# Patient Record
Sex: MALE | Race: WHITE | NOT HISPANIC OR LATINO
[De-identification: names, ages, dates, MRNs, and addresses within clinical notes are randomized per-mention and may not be internally consistent; named-entity substitution may affect disease eponyms.]

---

## 2015-11-24 RX ORDER — ASPIRIN/CALCIUM CARB/MAGNESIUM 324 MG
1 TABLET ORAL
Qty: 0 | Refills: 0 | DISCHARGE
Start: 2015-11-24

## 2017-03-17 ENCOUNTER — NON-APPOINTMENT (OUTPATIENT)
Age: 67
End: 2017-03-17

## 2017-03-17 ENCOUNTER — APPOINTMENT (OUTPATIENT)
Dept: CARDIOLOGY | Facility: CLINIC | Age: 67
End: 2017-03-17

## 2017-03-17 VITALS
DIASTOLIC BLOOD PRESSURE: 75 MMHG | OXYGEN SATURATION: 97 % | SYSTOLIC BLOOD PRESSURE: 172 MMHG | HEIGHT: 65 IN | WEIGHT: 241 LBS | BODY MASS INDEX: 40.15 KG/M2 | HEART RATE: 101 BPM

## 2017-05-15 ENCOUNTER — MEDICATION RENEWAL (OUTPATIENT)
Age: 67
End: 2017-05-15

## 2017-11-06 ENCOUNTER — APPOINTMENT (OUTPATIENT)
Dept: CARDIOLOGY | Facility: CLINIC | Age: 67
End: 2017-11-06
Payer: COMMERCIAL

## 2017-11-06 VITALS
WEIGHT: 238 LBS | BODY MASS INDEX: 39.65 KG/M2 | OXYGEN SATURATION: 95 % | HEART RATE: 72 BPM | SYSTOLIC BLOOD PRESSURE: 126 MMHG | DIASTOLIC BLOOD PRESSURE: 76 MMHG | HEIGHT: 65 IN

## 2017-11-06 PROCEDURE — 99215 OFFICE O/P EST HI 40 MIN: CPT

## 2017-11-22 ENCOUNTER — APPOINTMENT (OUTPATIENT)
Dept: CARDIOLOGY | Facility: CLINIC | Age: 67
End: 2017-11-22
Payer: COMMERCIAL

## 2017-11-22 PROCEDURE — A9500: CPT

## 2017-11-22 PROCEDURE — 93015 CV STRESS TEST SUPVJ I&R: CPT

## 2017-11-22 PROCEDURE — 78452 HT MUSCLE IMAGE SPECT MULT: CPT

## 2017-11-27 ENCOUNTER — APPOINTMENT (OUTPATIENT)
Dept: CARDIOLOGY | Facility: CLINIC | Age: 67
End: 2017-11-27
Payer: COMMERCIAL

## 2017-11-27 PROCEDURE — 93306 TTE W/DOPPLER COMPLETE: CPT

## 2017-12-11 DIAGNOSIS — R94.39 ABNORMAL RESULT OF OTHER CARDIOVASCULAR FUNCTION STUDY: ICD-10-CM

## 2017-12-14 ENCOUNTER — OUTPATIENT (OUTPATIENT)
Dept: OUTPATIENT SERVICES | Facility: HOSPITAL | Age: 67
LOS: 1 days | End: 2017-12-14
Payer: COMMERCIAL

## 2017-12-14 VITALS
DIASTOLIC BLOOD PRESSURE: 91 MMHG | RESPIRATION RATE: 18 BRPM | WEIGHT: 235.01 LBS | OXYGEN SATURATION: 96 % | SYSTOLIC BLOOD PRESSURE: 160 MMHG | HEART RATE: 90 BPM | TEMPERATURE: 98 F | HEIGHT: 65 IN

## 2017-12-14 DIAGNOSIS — R06.02 SHORTNESS OF BREATH: ICD-10-CM

## 2017-12-14 DIAGNOSIS — Z95.1 PRESENCE OF AORTOCORONARY BYPASS GRAFT: Chronic | ICD-10-CM

## 2017-12-14 DIAGNOSIS — Z86.79 PERSONAL HISTORY OF OTHER DISEASES OF THE CIRCULATORY SYSTEM: Chronic | ICD-10-CM

## 2017-12-14 DIAGNOSIS — Z98.89 OTHER SPECIFIED POSTPROCEDURAL STATES: Chronic | ICD-10-CM

## 2017-12-14 LAB
ALBUMIN SERPL ELPH-MCNC: 4.4 G/DL — SIGNIFICANT CHANGE UP (ref 3.3–5)
ALP SERPL-CCNC: 65 U/L — SIGNIFICANT CHANGE UP (ref 40–120)
ALT FLD-CCNC: 13 U/L RC — SIGNIFICANT CHANGE UP (ref 10–45)
ANION GAP SERPL CALC-SCNC: 12 MMOL/L — SIGNIFICANT CHANGE UP (ref 5–17)
AST SERPL-CCNC: 23 U/L — SIGNIFICANT CHANGE UP (ref 10–40)
BILIRUB SERPL-MCNC: 0.3 MG/DL — SIGNIFICANT CHANGE UP (ref 0.2–1.2)
BUN SERPL-MCNC: 16 MG/DL — SIGNIFICANT CHANGE UP (ref 7–23)
CALCIUM SERPL-MCNC: 9.5 MG/DL — SIGNIFICANT CHANGE UP (ref 8.4–10.5)
CHLORIDE SERPL-SCNC: 104 MMOL/L — SIGNIFICANT CHANGE UP (ref 96–108)
CO2 SERPL-SCNC: 26 MMOL/L — SIGNIFICANT CHANGE UP (ref 22–31)
CREAT SERPL-MCNC: 0.78 MG/DL — SIGNIFICANT CHANGE UP (ref 0.5–1.3)
GLUCOSE BLDC GLUCOMTR-MCNC: 108 MG/DL — HIGH (ref 70–99)
GLUCOSE SERPL-MCNC: 119 MG/DL — HIGH (ref 70–99)
HCT VFR BLD CALC: 45.8 % — SIGNIFICANT CHANGE UP (ref 39–50)
HGB BLD-MCNC: 15.2 G/DL — SIGNIFICANT CHANGE UP (ref 13–17)
MCHC RBC-ENTMCNC: 30.8 PG — SIGNIFICANT CHANGE UP (ref 27–34)
MCHC RBC-ENTMCNC: 33.2 GM/DL — SIGNIFICANT CHANGE UP (ref 32–36)
MCV RBC AUTO: 92.7 FL — SIGNIFICANT CHANGE UP (ref 80–100)
PLATELET # BLD AUTO: 290 K/UL — SIGNIFICANT CHANGE UP (ref 150–400)
POTASSIUM SERPL-MCNC: 4.6 MMOL/L — SIGNIFICANT CHANGE UP (ref 3.5–5.3)
POTASSIUM SERPL-SCNC: 4.6 MMOL/L — SIGNIFICANT CHANGE UP (ref 3.5–5.3)
PROT SERPL-MCNC: 7.5 G/DL — SIGNIFICANT CHANGE UP (ref 6–8.3)
RBC # BLD: 4.94 M/UL — SIGNIFICANT CHANGE UP (ref 4.2–5.8)
RBC # FLD: 13.5 % — SIGNIFICANT CHANGE UP (ref 10.3–14.5)
SODIUM SERPL-SCNC: 142 MMOL/L — SIGNIFICANT CHANGE UP (ref 135–145)
WBC # BLD: 9.6 K/UL — SIGNIFICANT CHANGE UP (ref 3.8–10.5)
WBC # FLD AUTO: 9.6 K/UL — SIGNIFICANT CHANGE UP (ref 3.8–10.5)

## 2017-12-14 PROCEDURE — 93457 R HRT ART/GRFT ANGIO: CPT

## 2017-12-14 PROCEDURE — 80053 COMPREHEN METABOLIC PANEL: CPT

## 2017-12-14 PROCEDURE — 93457 R HRT ART/GRFT ANGIO: CPT | Mod: 26

## 2017-12-14 PROCEDURE — C1887: CPT

## 2017-12-14 PROCEDURE — C1769: CPT

## 2017-12-14 PROCEDURE — C1894: CPT

## 2017-12-14 PROCEDURE — 93010 ELECTROCARDIOGRAM REPORT: CPT

## 2017-12-14 PROCEDURE — 85027 COMPLETE CBC AUTOMATED: CPT

## 2017-12-14 PROCEDURE — 82962 GLUCOSE BLOOD TEST: CPT

## 2017-12-14 PROCEDURE — 93005 ELECTROCARDIOGRAM TRACING: CPT

## 2017-12-14 RX ORDER — RANOLAZINE 500 MG/1
500 TABLET, FILM COATED, EXTENDED RELEASE ORAL ONCE
Qty: 0 | Refills: 0 | Status: COMPLETED | OUTPATIENT
Start: 2017-12-14 | End: 2017-12-14

## 2017-12-14 RX ADMIN — RANOLAZINE 500 MILLIGRAM(S): 500 TABLET, FILM COATED, EXTENDED RELEASE ORAL at 10:36

## 2017-12-14 NOTE — H&P CARDIOLOGY - HISTORY OF PRESENT ILLNESS
This is a 66 yo Citizen of [place] male with PMH former smoker 90 pack years, obesity, DM2 ( A1C 6.5, followed by endo Dr Dumont), HTN, HLD, CAD s/p CABG 4 vessel by Dr. Denis (AGUILERA-LAD, SVG-diagonal, SVG sequential graft to OM1-LCx PL) presented to cardiology, Dr. Phan, with complaints of progressing exertional SOB/JACOME over past 4 months relieved with rest.  Pt. states he has had a few episodes of nonradiating right sided chest pressure that was not associated with dizziness, diaphoresis, palpitations, nausea, vomiting, peripheral edema, recent weight gain, or syncope. NST 11/22/17 EF 46% medium sized, moderate defect in basal to mid inferoseptum and inferior wall that is partially reversible consistent with mild ischemia with scarring. TTE 11/27/17 EF 55-60% MAC, minimal MR.  Pt. presents today asymptomatic for further evaluation and cardiac cath.     Antianginal regimen:  Metoprolol; given Ranexa x 1 dose      < from: Cardiac Cath Lab - Adult (11.17.15 @ 13:46) >  Global left ventricular function was mildly depressed. EF  estimated was 50 %.  CORONARY VESSELS: The coronary circulation is left dominant.  LM:   --  Distal left main: There was a 80 % stenosis.  LAD:   --  LAD: Angiography showed mild atherosclerosis with no flow  limiting lesions.  CX:   --  Ostial circumflex: There was a 90 % stenosis.  --  OM1: There was a 80 % stenosis.  RCA:   --  RCA: Normal.  COMPLICATIONS: There were no complications.  DIAGNOSTIC RECOMMENDATIONS: Consultation with a cardiac surgeon will be  obtained for coronary artery bypass grafting.    < end of copied text >

## 2017-12-14 NOTE — H&P CARDIOLOGY - FAMILY HISTORY
Mother  Still living? Yes, Estimated age: Age Unknown  Family history of atrial fibrillation, Age at diagnosis: Age Unknown

## 2018-01-02 ENCOUNTER — NON-APPOINTMENT (OUTPATIENT)
Age: 68
End: 2018-01-02

## 2018-01-02 ENCOUNTER — APPOINTMENT (OUTPATIENT)
Dept: CARDIOLOGY | Facility: CLINIC | Age: 68
End: 2018-01-02
Payer: COMMERCIAL

## 2018-01-02 VITALS — HEART RATE: 80 BPM | SYSTOLIC BLOOD PRESSURE: 156 MMHG | DIASTOLIC BLOOD PRESSURE: 86 MMHG

## 2018-01-02 VITALS
BODY MASS INDEX: 39.15 KG/M2 | HEART RATE: 96 BPM | HEIGHT: 65 IN | DIASTOLIC BLOOD PRESSURE: 86 MMHG | SYSTOLIC BLOOD PRESSURE: 168 MMHG | OXYGEN SATURATION: 96 % | WEIGHT: 235 LBS

## 2018-01-02 DIAGNOSIS — Z95.1 PRESENCE OF AORTOCORONARY BYPASS GRAFT: ICD-10-CM

## 2018-01-02 PROCEDURE — 93000 ELECTROCARDIOGRAM COMPLETE: CPT

## 2018-01-02 PROCEDURE — 99215 OFFICE O/P EST HI 40 MIN: CPT

## 2018-01-02 RX ORDER — PEN NEEDLE, DIABETIC 29 G X1/2"
32G X 4 MM NEEDLE, DISPOSABLE MISCELLANEOUS
Qty: 540 | Refills: 0 | Status: ACTIVE | COMMUNITY
Start: 2016-07-06

## 2018-01-02 RX ORDER — EMPAGLIFLOZIN, METFORMIN HYDROCHLORIDE 12.5; 1 MG/1; MG/1
12.5-1 TABLET, EXTENDED RELEASE ORAL
Qty: 180 | Refills: 0 | Status: ACTIVE | COMMUNITY
Start: 2017-12-26

## 2018-01-02 RX ORDER — BLOOD SUGAR DIAGNOSTIC
STRIP MISCELLANEOUS
Qty: 600 | Refills: 0 | Status: ACTIVE | COMMUNITY
Start: 2016-11-07

## 2018-01-02 RX ORDER — INSULIN LISPRO 100 [IU]/ML
100 INJECTION, SOLUTION INTRAVENOUS; SUBCUTANEOUS
Qty: 30 | Refills: 0 | Status: ACTIVE | COMMUNITY
Start: 2017-09-06

## 2018-01-02 RX ORDER — INSULIN GLARGINE 100 [IU]/ML
100 INJECTION, SOLUTION SUBCUTANEOUS
Qty: 30 | Refills: 0 | Status: ACTIVE | COMMUNITY
Start: 2017-08-04

## 2018-02-21 ENCOUNTER — OUTPATIENT (OUTPATIENT)
Dept: OUTPATIENT SERVICES | Facility: HOSPITAL | Age: 68
LOS: 1 days | End: 2018-02-21
Payer: COMMERCIAL

## 2018-02-21 ENCOUNTER — APPOINTMENT (OUTPATIENT)
Dept: CT IMAGING | Facility: CLINIC | Age: 68
End: 2018-02-21

## 2018-02-21 DIAGNOSIS — Z86.79 PERSONAL HISTORY OF OTHER DISEASES OF THE CIRCULATORY SYSTEM: Chronic | ICD-10-CM

## 2018-02-21 DIAGNOSIS — Z95.1 PRESENCE OF AORTOCORONARY BYPASS GRAFT: Chronic | ICD-10-CM

## 2018-02-21 DIAGNOSIS — Z98.89 OTHER SPECIFIED POSTPROCEDURAL STATES: Chronic | ICD-10-CM

## 2018-02-21 DIAGNOSIS — Z00.8 ENCOUNTER FOR OTHER GENERAL EXAMINATION: ICD-10-CM

## 2018-02-21 PROCEDURE — 71250 CT THORAX DX C-: CPT

## 2018-02-21 PROCEDURE — 71250 CT THORAX DX C-: CPT | Mod: 26

## 2018-03-09 ENCOUNTER — APPOINTMENT (OUTPATIENT)
Dept: CARDIOLOGY | Facility: CLINIC | Age: 68
End: 2018-03-09
Payer: COMMERCIAL

## 2018-03-09 PROCEDURE — 93015 CV STRESS TEST SUPVJ I&R: CPT

## 2018-03-12 ENCOUNTER — APPOINTMENT (OUTPATIENT)
Dept: CARDIOLOGY | Facility: CLINIC | Age: 68
End: 2018-03-12
Payer: COMMERCIAL

## 2018-03-12 ENCOUNTER — NON-APPOINTMENT (OUTPATIENT)
Age: 68
End: 2018-03-12

## 2018-03-12 VITALS
SYSTOLIC BLOOD PRESSURE: 152 MMHG | DIASTOLIC BLOOD PRESSURE: 83 MMHG | HEIGHT: 65 IN | HEART RATE: 85 BPM | BODY MASS INDEX: 39.99 KG/M2 | OXYGEN SATURATION: 94 % | WEIGHT: 240 LBS

## 2018-03-12 PROCEDURE — 99214 OFFICE O/P EST MOD 30 MIN: CPT

## 2018-03-12 PROCEDURE — 93000 ELECTROCARDIOGRAM COMPLETE: CPT

## 2018-03-13 ENCOUNTER — APPOINTMENT (OUTPATIENT)
Dept: CARDIOLOGY | Facility: CLINIC | Age: 68
End: 2018-03-13
Payer: COMMERCIAL

## 2018-03-13 PROCEDURE — 93798 PHYS/QHP OP CAR RHAB W/ECG: CPT

## 2018-04-02 ENCOUNTER — APPOINTMENT (OUTPATIENT)
Dept: CARDIOLOGY | Facility: CLINIC | Age: 68
End: 2018-04-02
Payer: COMMERCIAL

## 2018-04-02 PROCEDURE — 93798 PHYS/QHP OP CAR RHAB W/ECG: CPT

## 2018-04-04 ENCOUNTER — APPOINTMENT (OUTPATIENT)
Dept: CARDIOLOGY | Facility: CLINIC | Age: 68
End: 2018-04-04
Payer: COMMERCIAL

## 2018-04-04 PROCEDURE — 93798 PHYS/QHP OP CAR RHAB W/ECG: CPT

## 2018-04-05 ENCOUNTER — APPOINTMENT (OUTPATIENT)
Dept: CARDIOLOGY | Facility: CLINIC | Age: 68
End: 2018-04-05
Payer: COMMERCIAL

## 2018-04-05 PROCEDURE — 93798 PHYS/QHP OP CAR RHAB W/ECG: CPT

## 2018-04-09 ENCOUNTER — APPOINTMENT (OUTPATIENT)
Dept: CARDIOLOGY | Facility: CLINIC | Age: 68
End: 2018-04-09
Payer: COMMERCIAL

## 2018-04-09 PROCEDURE — 93798 PHYS/QHP OP CAR RHAB W/ECG: CPT

## 2018-04-11 ENCOUNTER — APPOINTMENT (OUTPATIENT)
Dept: CARDIOLOGY | Facility: CLINIC | Age: 68
End: 2018-04-11
Payer: COMMERCIAL

## 2018-04-11 PROCEDURE — 93798 PHYS/QHP OP CAR RHAB W/ECG: CPT

## 2018-04-12 ENCOUNTER — APPOINTMENT (OUTPATIENT)
Dept: CARDIOLOGY | Facility: CLINIC | Age: 68
End: 2018-04-12
Payer: COMMERCIAL

## 2018-04-12 PROCEDURE — 93798 PHYS/QHP OP CAR RHAB W/ECG: CPT

## 2018-04-16 ENCOUNTER — APPOINTMENT (OUTPATIENT)
Dept: CARDIOLOGY | Facility: CLINIC | Age: 68
End: 2018-04-16
Payer: COMMERCIAL

## 2018-04-16 PROCEDURE — 93798 PHYS/QHP OP CAR RHAB W/ECG: CPT

## 2018-04-18 ENCOUNTER — APPOINTMENT (OUTPATIENT)
Dept: CARDIOLOGY | Facility: CLINIC | Age: 68
End: 2018-04-18
Payer: COMMERCIAL

## 2018-04-18 PROCEDURE — 93798 PHYS/QHP OP CAR RHAB W/ECG: CPT

## 2018-04-19 ENCOUNTER — APPOINTMENT (OUTPATIENT)
Dept: CARDIOLOGY | Facility: CLINIC | Age: 68
End: 2018-04-19
Payer: COMMERCIAL

## 2018-04-19 PROCEDURE — 93798 PHYS/QHP OP CAR RHAB W/ECG: CPT

## 2018-04-23 ENCOUNTER — APPOINTMENT (OUTPATIENT)
Dept: CARDIOLOGY | Facility: CLINIC | Age: 68
End: 2018-04-23
Payer: COMMERCIAL

## 2018-04-23 PROCEDURE — 93798 PHYS/QHP OP CAR RHAB W/ECG: CPT

## 2018-04-25 ENCOUNTER — APPOINTMENT (OUTPATIENT)
Dept: CARDIOLOGY | Facility: CLINIC | Age: 68
End: 2018-04-25
Payer: COMMERCIAL

## 2018-04-25 PROCEDURE — 93798 PHYS/QHP OP CAR RHAB W/ECG: CPT

## 2018-04-26 ENCOUNTER — APPOINTMENT (OUTPATIENT)
Dept: CARDIOLOGY | Facility: CLINIC | Age: 68
End: 2018-04-26
Payer: COMMERCIAL

## 2018-04-26 PROCEDURE — 93798 PHYS/QHP OP CAR RHAB W/ECG: CPT

## 2018-04-30 ENCOUNTER — APPOINTMENT (OUTPATIENT)
Dept: CARDIOLOGY | Facility: CLINIC | Age: 68
End: 2018-04-30
Payer: COMMERCIAL

## 2018-04-30 PROCEDURE — 93798 PHYS/QHP OP CAR RHAB W/ECG: CPT

## 2018-05-02 ENCOUNTER — APPOINTMENT (OUTPATIENT)
Dept: CARDIOLOGY | Facility: CLINIC | Age: 68
End: 2018-05-02
Payer: COMMERCIAL

## 2018-05-02 PROCEDURE — 93798 PHYS/QHP OP CAR RHAB W/ECG: CPT

## 2018-05-03 ENCOUNTER — APPOINTMENT (OUTPATIENT)
Dept: CARDIOLOGY | Facility: CLINIC | Age: 68
End: 2018-05-03
Payer: COMMERCIAL

## 2018-05-03 PROCEDURE — 93798 PHYS/QHP OP CAR RHAB W/ECG: CPT

## 2018-05-07 ENCOUNTER — APPOINTMENT (OUTPATIENT)
Dept: CARDIOLOGY | Facility: CLINIC | Age: 68
End: 2018-05-07
Payer: COMMERCIAL

## 2018-05-07 PROCEDURE — 93798 PHYS/QHP OP CAR RHAB W/ECG: CPT

## 2018-05-09 ENCOUNTER — APPOINTMENT (OUTPATIENT)
Dept: CARDIOLOGY | Facility: CLINIC | Age: 68
End: 2018-05-09
Payer: COMMERCIAL

## 2018-05-09 PROCEDURE — 93798 PHYS/QHP OP CAR RHAB W/ECG: CPT

## 2018-05-10 ENCOUNTER — APPOINTMENT (OUTPATIENT)
Dept: CARDIOLOGY | Facility: CLINIC | Age: 68
End: 2018-05-10
Payer: COMMERCIAL

## 2018-05-10 PROCEDURE — 93798 PHYS/QHP OP CAR RHAB W/ECG: CPT

## 2018-05-11 ENCOUNTER — APPOINTMENT (OUTPATIENT)
Dept: CARDIOLOGY | Facility: CLINIC | Age: 68
End: 2018-05-11
Payer: COMMERCIAL

## 2018-05-14 ENCOUNTER — APPOINTMENT (OUTPATIENT)
Dept: CARDIOLOGY | Facility: CLINIC | Age: 68
End: 2018-05-14
Payer: COMMERCIAL

## 2018-05-14 PROCEDURE — 93798 PHYS/QHP OP CAR RHAB W/ECG: CPT

## 2018-05-15 ENCOUNTER — APPOINTMENT (OUTPATIENT)
Dept: CARDIOLOGY | Facility: CLINIC | Age: 68
End: 2018-05-15
Payer: COMMERCIAL

## 2018-05-15 VITALS
OXYGEN SATURATION: 94 % | DIASTOLIC BLOOD PRESSURE: 80 MMHG | BODY MASS INDEX: 39.65 KG/M2 | SYSTOLIC BLOOD PRESSURE: 133 MMHG | WEIGHT: 238 LBS | HEART RATE: 72 BPM | HEIGHT: 65 IN

## 2018-05-15 PROCEDURE — 99214 OFFICE O/P EST MOD 30 MIN: CPT

## 2018-05-16 ENCOUNTER — APPOINTMENT (OUTPATIENT)
Dept: CARDIOLOGY | Facility: CLINIC | Age: 68
End: 2018-05-16
Payer: COMMERCIAL

## 2018-05-16 PROCEDURE — 93798 PHYS/QHP OP CAR RHAB W/ECG: CPT

## 2018-05-17 ENCOUNTER — APPOINTMENT (OUTPATIENT)
Dept: CARDIOLOGY | Facility: CLINIC | Age: 68
End: 2018-05-17
Payer: COMMERCIAL

## 2018-05-17 PROCEDURE — 93798 PHYS/QHP OP CAR RHAB W/ECG: CPT

## 2018-05-22 ENCOUNTER — MEDICATION RENEWAL (OUTPATIENT)
Age: 68
End: 2018-05-22

## 2018-05-23 ENCOUNTER — APPOINTMENT (OUTPATIENT)
Dept: CARDIOLOGY | Facility: CLINIC | Age: 68
End: 2018-05-23
Payer: COMMERCIAL

## 2018-05-23 PROCEDURE — 93798 PHYS/QHP OP CAR RHAB W/ECG: CPT

## 2018-05-24 ENCOUNTER — APPOINTMENT (OUTPATIENT)
Dept: CARDIOLOGY | Facility: CLINIC | Age: 68
End: 2018-05-24
Payer: COMMERCIAL

## 2018-05-24 PROCEDURE — 93798 PHYS/QHP OP CAR RHAB W/ECG: CPT

## 2018-05-30 ENCOUNTER — APPOINTMENT (OUTPATIENT)
Dept: CARDIOLOGY | Facility: CLINIC | Age: 68
End: 2018-05-30
Payer: COMMERCIAL

## 2018-05-30 PROCEDURE — 93798 PHYS/QHP OP CAR RHAB W/ECG: CPT

## 2018-05-31 ENCOUNTER — APPOINTMENT (OUTPATIENT)
Dept: CARDIOLOGY | Facility: CLINIC | Age: 68
End: 2018-05-31
Payer: COMMERCIAL

## 2018-05-31 PROCEDURE — 93798 PHYS/QHP OP CAR RHAB W/ECG: CPT

## 2018-06-04 ENCOUNTER — APPOINTMENT (OUTPATIENT)
Dept: CARDIOLOGY | Facility: CLINIC | Age: 68
End: 2018-06-04
Payer: COMMERCIAL

## 2018-06-04 PROCEDURE — 93798 PHYS/QHP OP CAR RHAB W/ECG: CPT

## 2018-06-06 ENCOUNTER — APPOINTMENT (OUTPATIENT)
Dept: CARDIOLOGY | Facility: CLINIC | Age: 68
End: 2018-06-06
Payer: COMMERCIAL

## 2018-06-06 PROCEDURE — 93798 PHYS/QHP OP CAR RHAB W/ECG: CPT

## 2018-06-07 ENCOUNTER — APPOINTMENT (OUTPATIENT)
Dept: CARDIOLOGY | Facility: CLINIC | Age: 68
End: 2018-06-07
Payer: COMMERCIAL

## 2018-06-07 PROCEDURE — 93798 PHYS/QHP OP CAR RHAB W/ECG: CPT

## 2018-06-11 ENCOUNTER — APPOINTMENT (OUTPATIENT)
Dept: CARDIOLOGY | Facility: CLINIC | Age: 68
End: 2018-06-11
Payer: COMMERCIAL

## 2018-06-11 PROCEDURE — 93798 PHYS/QHP OP CAR RHAB W/ECG: CPT

## 2018-06-13 ENCOUNTER — APPOINTMENT (OUTPATIENT)
Dept: CARDIOLOGY | Facility: CLINIC | Age: 68
End: 2018-06-13
Payer: COMMERCIAL

## 2018-06-13 PROCEDURE — 93798 PHYS/QHP OP CAR RHAB W/ECG: CPT

## 2018-06-14 ENCOUNTER — APPOINTMENT (OUTPATIENT)
Dept: CARDIOLOGY | Facility: CLINIC | Age: 68
End: 2018-06-14
Payer: COMMERCIAL

## 2018-06-14 PROCEDURE — 93798 PHYS/QHP OP CAR RHAB W/ECG: CPT

## 2018-06-18 ENCOUNTER — APPOINTMENT (OUTPATIENT)
Dept: CARDIOLOGY | Facility: CLINIC | Age: 68
End: 2018-06-18
Payer: COMMERCIAL

## 2018-06-18 PROCEDURE — 93798 PHYS/QHP OP CAR RHAB W/ECG: CPT

## 2018-06-20 ENCOUNTER — APPOINTMENT (OUTPATIENT)
Dept: CARDIOLOGY | Facility: CLINIC | Age: 68
End: 2018-06-20
Payer: COMMERCIAL

## 2018-06-20 PROCEDURE — 93798 PHYS/QHP OP CAR RHAB W/ECG: CPT

## 2018-06-21 ENCOUNTER — APPOINTMENT (OUTPATIENT)
Dept: CARDIOLOGY | Facility: CLINIC | Age: 68
End: 2018-06-21
Payer: COMMERCIAL

## 2018-06-21 PROCEDURE — 93798 PHYS/QHP OP CAR RHAB W/ECG: CPT

## 2018-06-25 ENCOUNTER — APPOINTMENT (OUTPATIENT)
Dept: CARDIOLOGY | Facility: CLINIC | Age: 68
End: 2018-06-25
Payer: COMMERCIAL

## 2018-06-25 PROCEDURE — 93798 PHYS/QHP OP CAR RHAB W/ECG: CPT

## 2018-08-10 ENCOUNTER — RX RENEWAL (OUTPATIENT)
Age: 68
End: 2018-08-10

## 2018-08-16 ENCOUNTER — RX RENEWAL (OUTPATIENT)
Age: 68
End: 2018-08-16

## 2018-08-20 ENCOUNTER — APPOINTMENT (OUTPATIENT)
Dept: CARDIOLOGY | Facility: CLINIC | Age: 68
End: 2018-08-20
Payer: COMMERCIAL

## 2018-08-20 VITALS
WEIGHT: 234 LBS | OXYGEN SATURATION: 95 % | SYSTOLIC BLOOD PRESSURE: 123 MMHG | BODY MASS INDEX: 38.99 KG/M2 | HEART RATE: 84 BPM | HEIGHT: 65 IN | DIASTOLIC BLOOD PRESSURE: 75 MMHG

## 2018-08-20 PROCEDURE — 99214 OFFICE O/P EST MOD 30 MIN: CPT

## 2018-10-04 ENCOUNTER — TRANSCRIPTION ENCOUNTER (OUTPATIENT)
Age: 68
End: 2018-10-04

## 2018-10-05 ENCOUNTER — RESULT REVIEW (OUTPATIENT)
Age: 68
End: 2018-10-05

## 2018-10-05 ENCOUNTER — OUTPATIENT (OUTPATIENT)
Dept: OUTPATIENT SERVICES | Facility: HOSPITAL | Age: 68
LOS: 1 days | End: 2018-10-05
Payer: COMMERCIAL

## 2018-10-05 DIAGNOSIS — Z86.010 PERSONAL HISTORY OF COLONIC POLYPS: ICD-10-CM

## 2018-10-05 DIAGNOSIS — Z86.79 PERSONAL HISTORY OF OTHER DISEASES OF THE CIRCULATORY SYSTEM: Chronic | ICD-10-CM

## 2018-10-05 DIAGNOSIS — Z98.89 OTHER SPECIFIED POSTPROCEDURAL STATES: Chronic | ICD-10-CM

## 2018-10-05 DIAGNOSIS — Z95.1 PRESENCE OF AORTOCORONARY BYPASS GRAFT: Chronic | ICD-10-CM

## 2018-10-05 PROCEDURE — 88305 TISSUE EXAM BY PATHOLOGIST: CPT

## 2018-10-05 PROCEDURE — 45385 COLONOSCOPY W/LESION REMOVAL: CPT | Mod: PT

## 2018-10-05 PROCEDURE — 82962 GLUCOSE BLOOD TEST: CPT

## 2018-10-05 PROCEDURE — 88305 TISSUE EXAM BY PATHOLOGIST: CPT | Mod: 26

## 2018-10-08 LAB — SURGICAL PATHOLOGY FINAL REPORT - CH: SIGNIFICANT CHANGE UP

## 2018-12-17 ENCOUNTER — APPOINTMENT (OUTPATIENT)
Dept: CARDIOLOGY | Facility: CLINIC | Age: 68
End: 2018-12-17
Payer: COMMERCIAL

## 2018-12-17 VITALS
OXYGEN SATURATION: 96 % | BODY MASS INDEX: 37.99 KG/M2 | DIASTOLIC BLOOD PRESSURE: 77 MMHG | HEART RATE: 80 BPM | SYSTOLIC BLOOD PRESSURE: 124 MMHG | HEIGHT: 65 IN | WEIGHT: 228 LBS

## 2018-12-17 DIAGNOSIS — R06.09 OTHER FORMS OF DYSPNEA: ICD-10-CM

## 2018-12-17 PROCEDURE — 99214 OFFICE O/P EST MOD 30 MIN: CPT

## 2018-12-17 NOTE — REVIEW OF SYSTEMS
[Eyeglasses] : currently wearing eyeglasses [Shortness Of Breath] : shortness of breath [Chest Pain] : chest pain [Joint Pain] : joint pain [Hand Pain] : hand pain [Knee Pain] : knee pain [Negative] : Genitourinary [Fever] : no fever [Headache] : no headache [Chills] : no chills [Feeling Fatigued] : not feeling fatigued [Blurry Vision] : no blurred vision [Seeing Double (Diplopia)] : no diplopia [Earache] : no earache [Sore Throat] : no sore throat [Sinus Pressure] : no sinus pressure [Chest  Pressure] : no chest pressure [Lower Ext Edema] : no extremity edema [Leg Claudication] : no intermittent leg claudication [Palpitations] : no palpitations [Skin: A Rash] : no rash: [Dizziness] : no dizziness [Depression] : no depression [Anxiety] : no anxiety [Excessive Thirst] : no polydipsia [Easy Bleeding] : no tendency for easy bleeding [Easy Bruising] : no tendency for easy bruising

## 2018-12-17 NOTE — DISCUSSION/SUMMARY
[FreeTextEntry1] : The patient is doing well. He has no signs or symptoms of active heart disease. He continues with exercise and has no chest pain, increasing shortness of breath, or palpitation. On his medications blood pressure, cholesterol, and blood sugar well controlled.\par \par He will send his present medication. He has any problems or symptoms he will call me. Otherwise I will see him in 6 months

## 2018-12-17 NOTE — HISTORY OF PRESENT ILLNESS
[FreeTextEntry1] : I saw Ilsa Samuel in the office today for a follow up visit. He is a 68-year-old white male who has diabetes, hypertension, hyperlipidemia, who I saw initially 11/16/15 with symptoms of unstable angina. The patient was sent for cardiac catheterization which demonstrated an ejection fraction of 50%. There was 80% distal left main, 90% ostial circumflex, and 80% OM1. The patient underwent coronary bypass graft surgery on 11/18/15, with LIMA graft to the LAD, and vein graft to the diagonal, OM one, and Posterior lateral artery. The patient returns for a followup visit.\par \par Most recent blood work performed 10/18 demonstrates an A1c of 6.6. Total cholesterol 120, triglycerides 108, HDL 35, and LDL 63.\par \par He was complaining of exertional dyspnea. Underwent an echocardiogram which showed normal ejection fraction without significant valvular heart disease. Stress test showed small to moderate area of partial reversible inferoseptal and inferior wall defect consistent with MI with arianne-infarction ischemia. EF was 46%. On the basis of that test and persistent symptoms he underwent cardiac catheterization. This showed closed native vessels with a patent LIMA graft to the LAD, and patent vein grafts to the D1, OM1, and OM 2. Wedge pressure was 20.RCA was nondominant and non-fixable. There was some consideration to fixing the LAD to improve blood flow to OM1. \par \par The patient unfortunately is still complaining of shortness of breatht.The patient saw Dr. Weil the pulmonologist in consultation who felt there was no pulmonary issues and that probably his main shortness of breath was coming from his weight and inactivity.  He had a stress test performed 3/18 that showed no ischemia total workload of 5.2 METs. The test was stopped because of shortness of breath.\par \par The patient has been participated in the supervised exercise program at [facility] 3/18-6/18. He's been doing better. During the program he lost 2 pounds going from 236-234. Exercise tolerance increased from 2.7-3.8 METs.. The breathing has improved somewhat. He is having no symptoms of angina. The patient feels that he is up to 80% of his pre-bypass exercise capacity. He continues to gradually lose weight. Overall he has lost about 12 pounds.\par \par

## 2018-12-17 NOTE — REASON FOR VISIT
[Initial Evaluation] : an initial evaluation of [Dyspnea] : dyspnea [Hyperlipidemia] : hyperlipidemia [Hypertension] : hypertension [FreeTextEntry1] :  Diabetes

## 2019-03-06 ENCOUNTER — EMERGENCY (EMERGENCY)
Facility: HOSPITAL | Age: 69
LOS: 1 days | Discharge: HOME | End: 2019-03-06
Attending: EMERGENCY MEDICINE
Payer: COMMERCIAL

## 2019-03-06 VITALS
HEART RATE: 72 BPM | DIASTOLIC BLOOD PRESSURE: 64 MMHG | OXYGEN SATURATION: 97 % | SYSTOLIC BLOOD PRESSURE: 129 MMHG | RESPIRATION RATE: 18 BRPM

## 2019-03-06 VITALS
HEART RATE: 84 BPM | RESPIRATION RATE: 16 BRPM | HEIGHT: 65 IN | TEMPERATURE: 98 F | OXYGEN SATURATION: 99 % | DIASTOLIC BLOOD PRESSURE: 84 MMHG | WEIGHT: 227.08 LBS | SYSTOLIC BLOOD PRESSURE: 174 MMHG

## 2019-03-06 DIAGNOSIS — Z86.79 PERSONAL HISTORY OF OTHER DISEASES OF THE CIRCULATORY SYSTEM: Chronic | ICD-10-CM

## 2019-03-06 DIAGNOSIS — Z98.89 OTHER SPECIFIED POSTPROCEDURAL STATES: Chronic | ICD-10-CM

## 2019-03-06 DIAGNOSIS — Z95.1 PRESENCE OF AORTOCORONARY BYPASS GRAFT: Chronic | ICD-10-CM

## 2019-03-06 LAB
ALBUMIN SERPL ELPH-MCNC: 4.6 G/DL — SIGNIFICANT CHANGE UP (ref 3.3–5)
ALP SERPL-CCNC: 56 U/L — SIGNIFICANT CHANGE UP (ref 40–120)
ALT FLD-CCNC: 12 U/L — SIGNIFICANT CHANGE UP (ref 10–45)
ANION GAP SERPL CALC-SCNC: 16 MMOL/L — SIGNIFICANT CHANGE UP (ref 5–17)
APPEARANCE UR: CLEAR — SIGNIFICANT CHANGE UP
APTT BLD: 30.4 SEC — SIGNIFICANT CHANGE UP (ref 27.5–36.3)
AST SERPL-CCNC: 23 U/L — SIGNIFICANT CHANGE UP (ref 10–40)
BACTERIA # UR AUTO: NEGATIVE — SIGNIFICANT CHANGE UP
BASOPHILS # BLD AUTO: 0.1 K/UL — SIGNIFICANT CHANGE UP (ref 0–0.2)
BASOPHILS NFR BLD AUTO: 0.6 % — SIGNIFICANT CHANGE UP (ref 0–2)
BILIRUB SERPL-MCNC: 0.5 MG/DL — SIGNIFICANT CHANGE UP (ref 0.2–1.2)
BILIRUB UR-MCNC: NEGATIVE — SIGNIFICANT CHANGE UP
BUN SERPL-MCNC: 15 MG/DL — SIGNIFICANT CHANGE UP (ref 7–23)
CALCIUM SERPL-MCNC: 10.2 MG/DL — SIGNIFICANT CHANGE UP (ref 8.4–10.5)
CHLORIDE SERPL-SCNC: 98 MMOL/L — SIGNIFICANT CHANGE UP (ref 96–108)
CO2 SERPL-SCNC: 27 MMOL/L — SIGNIFICANT CHANGE UP (ref 22–31)
COLOR SPEC: YELLOW — SIGNIFICANT CHANGE UP
CREAT SERPL-MCNC: 0.7 MG/DL — SIGNIFICANT CHANGE UP (ref 0.5–1.3)
DIFF PNL FLD: NEGATIVE — SIGNIFICANT CHANGE UP
EOSINOPHIL # BLD AUTO: 0.1 K/UL — SIGNIFICANT CHANGE UP (ref 0–0.5)
EOSINOPHIL NFR BLD AUTO: 1.5 % — SIGNIFICANT CHANGE UP (ref 0–6)
EPI CELLS # UR: 0 /HPF — SIGNIFICANT CHANGE UP
GLUCOSE SERPL-MCNC: 90 MG/DL — SIGNIFICANT CHANGE UP (ref 70–99)
GLUCOSE UR QL: ABNORMAL
HCT VFR BLD CALC: 45.4 % — SIGNIFICANT CHANGE UP (ref 39–50)
HGB BLD-MCNC: 15.8 G/DL — SIGNIFICANT CHANGE UP (ref 13–17)
HYALINE CASTS # UR AUTO: 0 /LPF — SIGNIFICANT CHANGE UP (ref 0–2)
INR BLD: 1.01 RATIO — SIGNIFICANT CHANGE UP (ref 0.88–1.16)
KETONES UR-MCNC: NEGATIVE — SIGNIFICANT CHANGE UP
LEUKOCYTE ESTERASE UR-ACNC: NEGATIVE — SIGNIFICANT CHANGE UP
LIDOCAIN IGE QN: 49 U/L — SIGNIFICANT CHANGE UP (ref 7–60)
LYMPHOCYTES # BLD AUTO: 3.3 K/UL — SIGNIFICANT CHANGE UP (ref 1–3.3)
LYMPHOCYTES # BLD AUTO: 36.2 % — SIGNIFICANT CHANGE UP (ref 13–44)
MCHC RBC-ENTMCNC: 31.4 PG — SIGNIFICANT CHANGE UP (ref 27–34)
MCHC RBC-ENTMCNC: 34.8 GM/DL — SIGNIFICANT CHANGE UP (ref 32–36)
MCV RBC AUTO: 90.3 FL — SIGNIFICANT CHANGE UP (ref 80–100)
MONOCYTES # BLD AUTO: 0.6 K/UL — SIGNIFICANT CHANGE UP (ref 0–0.9)
MONOCYTES NFR BLD AUTO: 6.8 % — SIGNIFICANT CHANGE UP (ref 2–14)
NEUTROPHILS # BLD AUTO: 5.1 K/UL — SIGNIFICANT CHANGE UP (ref 1.8–7.4)
NEUTROPHILS NFR BLD AUTO: 54.9 % — SIGNIFICANT CHANGE UP (ref 43–77)
NITRITE UR-MCNC: NEGATIVE — SIGNIFICANT CHANGE UP
PH UR: 5.5 — SIGNIFICANT CHANGE UP (ref 5–8)
PLATELET # BLD AUTO: 281 K/UL — SIGNIFICANT CHANGE UP (ref 150–400)
POTASSIUM SERPL-MCNC: 4.3 MMOL/L — SIGNIFICANT CHANGE UP (ref 3.5–5.3)
POTASSIUM SERPL-SCNC: 4.3 MMOL/L — SIGNIFICANT CHANGE UP (ref 3.5–5.3)
PROT SERPL-MCNC: 7.5 G/DL — SIGNIFICANT CHANGE UP (ref 6–8.3)
PROT UR-MCNC: NEGATIVE — SIGNIFICANT CHANGE UP
PROTHROM AB SERPL-ACNC: 11.6 SEC — SIGNIFICANT CHANGE UP (ref 10–12.9)
RBC # BLD: 5.03 M/UL — SIGNIFICANT CHANGE UP (ref 4.2–5.8)
RBC # FLD: 13 % — SIGNIFICANT CHANGE UP (ref 10.3–14.5)
RBC CASTS # UR COMP ASSIST: 3 /HPF — SIGNIFICANT CHANGE UP (ref 0–4)
SODIUM SERPL-SCNC: 141 MMOL/L — SIGNIFICANT CHANGE UP (ref 135–145)
SP GR SPEC: 1.03 — HIGH (ref 1.01–1.02)
UROBILINOGEN FLD QL: NEGATIVE — SIGNIFICANT CHANGE UP
WBC # BLD: 9.2 K/UL — SIGNIFICANT CHANGE UP (ref 3.8–10.5)
WBC # FLD AUTO: 9.2 K/UL — SIGNIFICANT CHANGE UP (ref 3.8–10.5)
WBC UR QL: 1 /HPF — SIGNIFICANT CHANGE UP (ref 0–5)

## 2019-03-06 PROCEDURE — 76705 ECHO EXAM OF ABDOMEN: CPT | Mod: 26

## 2019-03-06 PROCEDURE — 76705 ECHO EXAM OF ABDOMEN: CPT | Mod: 26,RT

## 2019-03-06 PROCEDURE — 82962 GLUCOSE BLOOD TEST: CPT

## 2019-03-06 PROCEDURE — 85027 COMPLETE CBC AUTOMATED: CPT

## 2019-03-06 PROCEDURE — 85610 PROTHROMBIN TIME: CPT

## 2019-03-06 PROCEDURE — 74176 CT ABD & PELVIS W/O CONTRAST: CPT

## 2019-03-06 PROCEDURE — 99284 EMERGENCY DEPT VISIT MOD MDM: CPT | Mod: 25

## 2019-03-06 PROCEDURE — 85730 THROMBOPLASTIN TIME PARTIAL: CPT

## 2019-03-06 PROCEDURE — 80053 COMPREHEN METABOLIC PANEL: CPT

## 2019-03-06 PROCEDURE — 93010 ELECTROCARDIOGRAM REPORT: CPT

## 2019-03-06 PROCEDURE — 87086 URINE CULTURE/COLONY COUNT: CPT

## 2019-03-06 PROCEDURE — 78226 HEPATOBILIARY SYSTEM IMAGING: CPT

## 2019-03-06 PROCEDURE — 81001 URINALYSIS AUTO W/SCOPE: CPT

## 2019-03-06 PROCEDURE — 93005 ELECTROCARDIOGRAM TRACING: CPT

## 2019-03-06 PROCEDURE — 96375 TX/PRO/DX INJ NEW DRUG ADDON: CPT

## 2019-03-06 PROCEDURE — 76705 ECHO EXAM OF ABDOMEN: CPT

## 2019-03-06 PROCEDURE — 96374 THER/PROPH/DIAG INJ IV PUSH: CPT | Mod: XU

## 2019-03-06 PROCEDURE — 78226 HEPATOBILIARY SYSTEM IMAGING: CPT | Mod: 26

## 2019-03-06 PROCEDURE — A9537: CPT

## 2019-03-06 PROCEDURE — 74176 CT ABD & PELVIS W/O CONTRAST: CPT | Mod: 26

## 2019-03-06 PROCEDURE — 83690 ASSAY OF LIPASE: CPT

## 2019-03-06 RX ORDER — ACETAMINOPHEN 500 MG
975 TABLET ORAL ONCE
Qty: 0 | Refills: 0 | Status: COMPLETED | OUTPATIENT
Start: 2019-03-06 | End: 2019-03-06

## 2019-03-06 RX ORDER — SODIUM CHLORIDE 9 MG/ML
1000 INJECTION INTRAMUSCULAR; INTRAVENOUS; SUBCUTANEOUS ONCE
Qty: 0 | Refills: 0 | Status: COMPLETED | OUTPATIENT
Start: 2019-03-06 | End: 2019-03-06

## 2019-03-06 RX ORDER — MORPHINE SULFATE 50 MG/1
4 CAPSULE, EXTENDED RELEASE ORAL ONCE
Qty: 0 | Refills: 0 | Status: DISCONTINUED | OUTPATIENT
Start: 2019-03-06 | End: 2019-03-06

## 2019-03-06 RX ORDER — KETOROLAC TROMETHAMINE 30 MG/ML
15 SYRINGE (ML) INJECTION ONCE
Qty: 0 | Refills: 0 | Status: DISCONTINUED | OUTPATIENT
Start: 2019-03-06 | End: 2019-03-06

## 2019-03-06 RX ORDER — ONDANSETRON 8 MG/1
4 TABLET, FILM COATED ORAL ONCE
Qty: 0 | Refills: 0 | Status: COMPLETED | OUTPATIENT
Start: 2019-03-06 | End: 2019-03-06

## 2019-03-06 RX ADMIN — SODIUM CHLORIDE 1000 MILLILITER(S): 9 INJECTION INTRAMUSCULAR; INTRAVENOUS; SUBCUTANEOUS at 15:17

## 2019-03-06 RX ADMIN — ONDANSETRON 4 MILLIGRAM(S): 8 TABLET, FILM COATED ORAL at 15:18

## 2019-03-06 RX ADMIN — Medication 15 MILLIGRAM(S): at 15:18

## 2019-03-06 RX ADMIN — Medication 15 MILLIGRAM(S): at 15:55

## 2019-03-06 NOTE — ED PROVIDER NOTE - PMH
CAD (coronary artery disease)    Diabetes mellitus, type 2    Hyperlipidemia    Hypertension    Obstructive sleep apnea

## 2019-03-06 NOTE — ED PROVIDER NOTE - CARE PLAN
Principal Discharge DX:	Cholelithiasis  Assessment and plan of treatment:	1) Follow-up with your primary care provider in 2-3 days. Follow-up in the general surgery clinic within 1 week.  2) Continue to take all medications as prescribed.  3) Return to the ER for any new or worsening symptoms including but not limited to severe abdominal pain, vomiting, change in stools or urine, passing out (fainting), fevers, or if any other concerns.

## 2019-03-06 NOTE — ED CLERICAL - NS ED CLERK NOTE PRE-ARRIVAL INFORMATION; ADDITIONAL PRE-ARRIVAL INFORMATION
CC/Reason For referral: gallbladder attack, kidney stones   Preferred Consultant(if applicable):  Who admits for you (if needed): Hospitalist   Do you have documents you would like to fax over?  Would you still like to speak to an ED attending?

## 2019-03-06 NOTE — ED PROVIDER NOTE - NSFOLLOWUPINSTRUCTIONS_ED_ALL_ED_FT
1) Follow-up with your primary care provider in 2-3 days. Follow-up in the general surgery clinic within 1 week.  2) Continue to take all medications as prescribed.  3) Return to the ER for any new or worsening symptoms including but not limited to severe abdominal pain, vomiting, change in stools or urine, passing out (fainting), fevers, or if any other concerns.

## 2019-03-06 NOTE — ED ADULT NURSE REASSESSMENT NOTE - NS ED NURSE REASSESS COMMENT FT1
Pt felt dizzy prior to CT scan. Pt reports only eating small breakfast this AM. JUJU Krishnamurthy aware, sugar checked, BS 78. Pt taken to CT scan by transporter.

## 2019-03-06 NOTE — ED PROVIDER NOTE - OBJECTIVE STATEMENT
67 y/o M with PMHx of HTn, HLD, CAD s/p CABGx4, T2DM, LENARD presents c/o RUQ pain x 2 days. Pt notes pain started when asleep, constant waxing and waning, dull and pressure intermittently sharp, radiates to the R sided thoracolumbar area, not a/w food or BMs, no clear palliative or provoking factors, took one Aleve this AM with minimal relief, a/w nausea. Pt was seen by PCP today for this issue and referred to ED. Pt denies f/c, CP, SOB, vomiting, diarrhea, constipation, melena, hematochezia, dysuria, hematuria, ripping/tearing pain, lightheadedness, syncope, paresthesias, numbness, jaundice, prior episodes, h/o GB or Kidney stones. 69 y/o M with PMHx of HTn, HLD, CAD s/p CABGx4, T2DM, LENARD presents c/o RUQ pain x 2 days. Pt notes pain started when asleep, constant waxing and waning, dull and pressure intermittently sharp, radiates to the R sided thoracolumbar area, not a/w food or BMs, no clear palliative or provoking factors, took one Aleve this AM with minimal relief, a/w nausea. Pt was seen by PCP today for this issue and referred to ED. Pt denies f/c, CP, SOB, vomiting, diarrhea, constipation, melena, hematochezia, dysuria, hematuria, ripping/tearing pain, lightheadedness, syncope, paresthesias, numbness, jaundice, prior episodes, h/o GB or Kidney stones.    Attendinyo male presents with intermittent right flank pain for the past 2 days.  pt states he cannot find a comfortable position when pain attacks come.  currently pain is improved.  no fever.  no chills.  no vomiting.

## 2019-03-06 NOTE — ED ADULT NURSE NOTE - NSIMPLEMENTINTERV_GEN_ALL_ED
Implemented All Universal Safety Interventions:  [place] to call system. Call bell, personal items and telephone within reach. Instruct patient to call for assistance. Room bathroom lighting operational. Non-slip footwear when patient is off stretcher. Physically safe environment: no spills, clutter or unnecessary equipment. Stretcher in lowest position, wheels locked, appropriate side rails in place.

## 2019-03-06 NOTE — ED PROVIDER NOTE - PHYSICAL EXAMINATION
GEN: Pt in NAD, A&O x3.  PSYCH: Affect appropriate.  EYES: Sclera white, no icterus, PERRLA.   ENT: Head NCAT. Nose w/o deformity. No auricular TTP. MMM. Neck supple FROM.   RESP: No chest wall tenderness, CTA b/l, no wheezes, rales, or rhonchi.   CARDIAC: RRR, clear distinct S1, S2, no appreciable murmurs.  ABD: Abdomen soft, mild TTP RUQ and L lumbar region of abdomen, +Ruelas's. + R sided CVAT.  VASC: 2+ radial and dorsalis pedis pulses b/l. No edema or calf tenderness.  MSK: Moves all extremities. Steady gait.   SKIN: No rashes on the trunk.

## 2019-03-06 NOTE — ED ADULT NURSE NOTE - OBJECTIVE STATEMENT
69 y/o male PMH HTN, diabetes and quadruple bypass presents to ED reporting RUQ pain. Pt came in from PCP for abdominal pain. Pt reports pain began on Monday and accompanied with nausea. On exam, AOx3, speaking in complete sentences. Abdomen soft, tender RLQ and RUQ, non-distended, normoactive bowel sounds in all 4 quadrants, positive R sided CVA. Lung sounds CTA, NAD, O2 sat 97% RA. Heplock placed labs collected. CM in place, NSR HR 83. Awaiting CT scan at this time. Wife at bedside.

## 2019-03-06 NOTE — ED PROVIDER NOTE - PROGRESS NOTE DETAILS
ED US team reading US as stone in GB neck without inflammation, equivocal for acute cholecystitis, pt still in some pain but improved with Toradol. Surgery paged. -Yvan Hernandez PA-C d/w surgery resident who requests formal US and/or HIDA with call back if abnormalities that warrant surgery consult. -Yvan Hernandez PA-C d/w surgery resident, formal US showing cholelithiasis with +Ruelas's recommending HIDA if concern for acute cholecystitis. Plan for CDU for pain control, serial abd exams, and HIDA. Surgery will see pt after HIDA performed. d/w CDU PA, will see pt.  -Yvan Hernandez PA-C HIDA shows no acute cholecystitis, pt pain controlled, labs including WBC, bili, LFTs wnl. Pt stable for DC with outpt sx f/u. -Yvan Hernandez PA-C

## 2019-03-07 LAB
CULTURE RESULTS: NO GROWTH — SIGNIFICANT CHANGE UP
SPECIMEN SOURCE: SIGNIFICANT CHANGE UP

## 2019-03-20 ENCOUNTER — APPOINTMENT (OUTPATIENT)
Dept: SURGERY | Facility: CLINIC | Age: 69
End: 2019-03-20
Payer: COMMERCIAL

## 2019-03-20 VITALS
TEMPERATURE: 98.1 F | HEIGHT: 65 IN | WEIGHT: 227 LBS | HEART RATE: 74 BPM | OXYGEN SATURATION: 98 % | RESPIRATION RATE: 16 BRPM | BODY MASS INDEX: 37.82 KG/M2 | SYSTOLIC BLOOD PRESSURE: 137 MMHG | DIASTOLIC BLOOD PRESSURE: 81 MMHG

## 2019-03-20 DIAGNOSIS — Z80.0 FAMILY HISTORY OF MALIGNANT NEOPLASM OF DIGESTIVE ORGANS: ICD-10-CM

## 2019-03-20 DIAGNOSIS — Z78.9 OTHER SPECIFIED HEALTH STATUS: ICD-10-CM

## 2019-03-20 DIAGNOSIS — Z80.52 FAMILY HISTORY OF MALIGNANT NEOPLASM OF BLADDER: ICD-10-CM

## 2019-03-20 PROCEDURE — 99244 OFF/OP CNSLTJ NEW/EST MOD 40: CPT

## 2019-03-20 RX ORDER — BENZOCAINE/BENZALKONIUM/ALOE/E 5 %-0.13 %
CREAM (GRAM) TOPICAL
Refills: 0 | Status: ACTIVE | COMMUNITY

## 2019-03-20 RX ORDER — MULTIVITAMIN
TABLET ORAL
Refills: 0 | Status: ACTIVE | COMMUNITY

## 2019-03-20 RX ORDER — VITAMIN K2 90 MCG
125 MCG CAPSULE ORAL
Refills: 0 | Status: ACTIVE | COMMUNITY

## 2019-03-20 RX ORDER — METFORMIN HYDROCHLORIDE 625 MG/1
TABLET ORAL
Refills: 0 | Status: DISCONTINUED | COMMUNITY
End: 2019-03-20

## 2019-03-20 RX ORDER — INDAPAMIDE 1.25 MG/1
1.25 TABLET, FILM COATED ORAL
Refills: 0 | Status: ACTIVE | COMMUNITY

## 2019-03-20 RX ORDER — EMPAGLIFLOZIN 10 MG/1
10 TABLET, FILM COATED ORAL
Refills: 0 | Status: DISCONTINUED | COMMUNITY
End: 2019-03-20

## 2019-03-20 NOTE — CONSULT LETTER
[Dear  ___] : Dear  [unfilled], [Sincerely,] : Sincerely, [FreeTextEntry2] : Dr. Jose Manuel Wayne [FreeTextEntry1] : I saw your patient Ilsa Samuel in my office on March 20th for evaluation of gallbladder disease. He stated that 2 weeks earlier he experienced 3 days of progressively worsening right upper quadrant abdominal pain and nausea which ultimately led to a visit to the emergency department. At that time a stone -hunt CT showed no evidence of kidney stones but demonstrated cholelithiasis. Abdominal ultrasound confirmed presence of the gallstones but showed no evidence of gallbladder wall thickening or ductal dilatation. Subsequent HIDA scan showed no evidence of biliary obstruction and the patient was ultimately discharged home when his symptoms resolved. The patient denied any history of emesis, fever or chills and also denied any history of jaundice, dark urine or acholic stools.\par \par On exam he was anicteric and in no distress. The abdomen was soft, nondistended and nontender without palpable mass or organomegaly. A mild degree of upper midline diastasis was noted and at the umbilicus, a 1.5 cm nontender, reducible, fat-containing hernia was apparent. The remainder of the exam was noncontributory.\par \par I discussed situation with Mr. Samuel and his wife and advised elective laparoscopic cholecystectomy for his symptomatic gallstones. Once he comes to surgery I will update you on his status and if I can be of additional help, please feel free to call. [FreeTextEntry3] : \par \par Pako Cherry M.D., F.A.C.S.

## 2019-03-20 NOTE — HISTORY OF PRESENT ILLNESS
[de-identified] : Ilsa is a 69 y/o male here for evaluation of symptomatic cholelithiasis.  [de-identified] : 2 weeks ago patient experienced 3 days of progressively worsening right upper quadrant abdominal pain associated with nausea but no emesis. He was ultimately seen by his PCP and referred to the emergency department where abdominal ultrasound demonstrated gallstones but no gallbladder wall thickening. CT scan showed no evidence of kidney stones and confirmed the presence of gallstones. HIDA scan showed no obstruction and patient was discharged home when his pain resolved. No history of jaundice, dark urine or acholic stools and liver chemistries were normal.

## 2019-03-20 NOTE — PLAN
[FreeTextEntry1] : Advised elective laparoscopic cholecystectomy. Discussed with patient and wife nature of, indications for and risks/benefits of surgery.

## 2019-03-21 ENCOUNTER — NON-APPOINTMENT (OUTPATIENT)
Age: 69
End: 2019-03-21

## 2019-03-21 ENCOUNTER — APPOINTMENT (OUTPATIENT)
Dept: CARDIOLOGY | Facility: CLINIC | Age: 69
End: 2019-03-21
Payer: COMMERCIAL

## 2019-03-21 VITALS
WEIGHT: 231 LBS | BODY MASS INDEX: 38.49 KG/M2 | HEIGHT: 65 IN | OXYGEN SATURATION: 97 % | SYSTOLIC BLOOD PRESSURE: 133 MMHG | HEART RATE: 84 BPM | DIASTOLIC BLOOD PRESSURE: 80 MMHG

## 2019-03-21 DIAGNOSIS — I51.9 HEART DISEASE, UNSPECIFIED: ICD-10-CM

## 2019-03-21 DIAGNOSIS — G47.30 SLEEP APNEA, UNSPECIFIED: ICD-10-CM

## 2019-03-21 PROCEDURE — 93000 ELECTROCARDIOGRAM COMPLETE: CPT

## 2019-03-21 PROCEDURE — 99214 OFFICE O/P EST MOD 30 MIN: CPT

## 2019-03-21 NOTE — HISTORY OF PRESENT ILLNESS
[FreeTextEntry1] : I saw Ilsa Samuel in the office today for a follow up visit,in anticipation of laparoscopic cholecystectomy. . He is a 68-year-old white male who has diabetes, hypertension, hyperlipidemia, who I saw initially 11/16/15 with symptoms of unstable angina. The patient was sent for cardiac catheterization which demonstrated an ejection fraction of 50%. There was 80% distal left main, 90% ostial circumflex, and 80% OM1. The patient underwent coronary bypass graft surgery on 11/18/15, with LIMA graft to the LAD, and vein graft to the diagonal, OM one, and Posterior lateral artery. The patient returns for a followup visit.\par \par Blood work performed 1/19 demonstrates a cholesterol of 122, HDL 34, and LDL 65, A1c was 7.0. Chemistries and CBC unremarkable.\par \par He was complaining of exertional dyspnea. Underwent an echocardiogram which showed normal ejection fraction without significant valvular heart disease. Stress test showed small to moderate area of partial reversible inferoseptal and inferior wall defect consistent with MI with arianne-infarction ischemia. EF was 46%. On the basis of that test and persistent symptoms he underwent cardiac catheterization. This showed closed native vessels with a patent LIMA graft to the LAD, and patent vein grafts to the D1, OM1, and OM 2. Wedge pressure was 20.RCA was nondominant and non-fixable. There was some consideration to fixing the LAD to improve blood flow to OM1. \par \par The patient still complains of dyspnea on exertion which is stable. This is probably a combination of his pulmonary disease, weight, and diastolic dysfunction. Does see Dr. Weil the pulmonologist. \par \par The patient completed the cardiac size program at [facility]. He now exercises on his own and has no chest pain.\par \par Her resting 12-lead electrocardiogram demonstrates sinus rhythm without acute change.\par \par

## 2019-03-21 NOTE — DISCUSSION/SUMMARY
[FreeTextEntry1] : This is a 68-year-old white male who is status post coronary bypass graft surgery 11/15. Repeat cardiac catheterization performed 12/17 demonstrated that his LIMA to the LAD was patent, as was the vein grafts to T1, OM1, no M2. He has a nondominant right that was not fixable.\par \par His cardiac status is stable. There are no cardiac contraindications to the planned surgery. No special precautions other than routine hemodynamic monitoring should be required. He will hold his indapamide the morning of surgery. He will discuss with your office about the aspirin.

## 2019-03-21 NOTE — PHYSICAL EXAM
[General Appearance - Well Developed] : well developed [Normal Appearance] : normal appearance [Well Groomed] : well groomed [General Appearance - Well Nourished] : well nourished [No Deformities] : no deformities [General Appearance - In No Acute Distress] : no acute distress [Normal Conjunctiva] : the conjunctiva exhibited no abnormalities [Normal Oral Mucosa] : normal oral mucosa [Normal Jugular Venous A Waves Present] : normal jugular venous A waves present [Normal Jugular Venous V Waves Present] : normal jugular venous V waves present [No Jugular Venous Soto A Waves] : no jugular venous soto A waves [Respiration, Rhythm And Depth] : normal respiratory rhythm and effort [Exaggerated Use Of Accessory Muscles For Inspiration] : no accessory muscle use [Auscultation Breath Sounds / Voice Sounds] : lungs were clear to auscultation bilaterally [Bowel Sounds] : normal bowel sounds [Abdomen Soft] : soft [Abdomen Tenderness] : non-tender [Abnormal Walk] : normal gait [Gait - Sufficient For Exercise Testing] : the gait was sufficient for exercise testing [Nail Clubbing] : no clubbing of the fingernails [Cyanosis, Localized] : no localized cyanosis [Skin Color & Pigmentation] : normal skin color and pigmentation [Skin Turgor] : normal skin turgor [] : no rash [Oriented To Time, Place, And Person] : oriented to person, place, and time [Impaired Insight] : insight and judgment were intact [No Anxiety] : not feeling anxious [Not Palpable] : not palpable [Normal Rate] : normal [Normal S1] : normal S1 [Normal S2] : normal S2 [No Murmur] : no murmurs heard [2+] : left 2+ [1+] : left 1+ [No Abnormalities] : the abdominal aorta was not enlarged and no bruit was heard [No Pitting Edema] : no pitting edema present [S3] : no S3 [S4] : no S4 [Right Carotid Bruit] : no bruit heard over the right carotid [Left Carotid Bruit] : no bruit heard over the left carotid [Right Femoral Bruit] : no bruit heard over the right femoral artery [Left Femoral Bruit] : no bruit heard over the left femoral artery

## 2019-03-22 ENCOUNTER — OUTPATIENT (OUTPATIENT)
Dept: OUTPATIENT SERVICES | Facility: HOSPITAL | Age: 69
LOS: 1 days | End: 2019-03-22
Payer: COMMERCIAL

## 2019-03-22 VITALS
HEART RATE: 91 BPM | HEIGHT: 64.5 IN | OXYGEN SATURATION: 96 % | TEMPERATURE: 99 F | WEIGHT: 227.08 LBS | SYSTOLIC BLOOD PRESSURE: 136 MMHG | RESPIRATION RATE: 15 BRPM | DIASTOLIC BLOOD PRESSURE: 83 MMHG

## 2019-03-22 DIAGNOSIS — Z86.79 PERSONAL HISTORY OF OTHER DISEASES OF THE CIRCULATORY SYSTEM: ICD-10-CM

## 2019-03-22 DIAGNOSIS — Z98.89 OTHER SPECIFIED POSTPROCEDURAL STATES: Chronic | ICD-10-CM

## 2019-03-22 DIAGNOSIS — Z95.1 PRESENCE OF AORTOCORONARY BYPASS GRAFT: Chronic | ICD-10-CM

## 2019-03-22 DIAGNOSIS — E11.9 TYPE 2 DIABETES MELLITUS WITHOUT COMPLICATIONS: ICD-10-CM

## 2019-03-22 DIAGNOSIS — Z98.890 OTHER SPECIFIED POSTPROCEDURAL STATES: Chronic | ICD-10-CM

## 2019-03-22 DIAGNOSIS — K80.20 CALCULUS OF GALLBLADDER WITHOUT CHOLECYSTITIS WITHOUT OBSTRUCTION: ICD-10-CM

## 2019-03-22 DIAGNOSIS — I25.10 ATHEROSCLEROTIC HEART DISEASE OF NATIVE CORONARY ARTERY WITHOUT ANGINA PECTORIS: ICD-10-CM

## 2019-03-22 DIAGNOSIS — G47.33 OBSTRUCTIVE SLEEP APNEA (ADULT) (PEDIATRIC): ICD-10-CM

## 2019-03-22 DIAGNOSIS — I10 ESSENTIAL (PRIMARY) HYPERTENSION: ICD-10-CM

## 2019-03-22 DIAGNOSIS — Z86.79 PERSONAL HISTORY OF OTHER DISEASES OF THE CIRCULATORY SYSTEM: Chronic | ICD-10-CM

## 2019-03-22 PROCEDURE — G0463: CPT

## 2019-03-22 RX ORDER — CANAGLIFLOZIN AND METFORMIN HYDROCHLORIDE 50; 500 MG/1; MG/1
1 TABLET, FILM COATED, EXTENDED RELEASE ORAL
Qty: 0 | Refills: 0 | COMMUNITY

## 2019-03-22 NOTE — H&P PST ADULT - LAST CARDIAC ANGIOGRAM/IMAGING
12/2017,  LAD closed, other vessels patent per Dr. Phan 12/2017,  LAD closed, other grafts patent per Dr. Phan

## 2019-03-22 NOTE — H&P PST ADULT - ACTIVITY
walks, gardening, climbs steps, able to participate moderate activities walks, gardening, climbs steps, able to participate in moderate activities

## 2019-03-22 NOTE — H&P PST ADULT - NEGATIVE CARDIOVASCULAR SYMPTOMS
Patient states that she tried to refill her Valsartan, but there is a recall on it. Is there something else you could send in for her? She is now living in [place] with her son and would like to have it sent to [facility] [address].   Please call her back at [phone] no palpitations/no peripheral edema/no chest pain

## 2019-03-22 NOTE — H&P PST ADULT - NSICDXPASTSURGICALHX_GEN_ALL_CORE_FT
PAST SURGICAL HISTORY:  H/O colonoscopy with polypectomy 2018    History of tonsillectomy child    History of varicocele     S/P CABG x 4 11/2015    S/P excision of lipoma left cheek, ~10 years ago

## 2019-03-22 NOTE — H&P PST ADULT - NSICDXPASTMEDICALHX_GEN_ALL_CORE_FT
PAST MEDICAL HISTORY:  CAD (coronary artery disease) s/p CABG  (11/2015)    Diabetes mellitus, type 2     Hyperlipidemia     Hypertension     Obstructive sleep apnea CPAP at night PAST MEDICAL HISTORY:  CAD (coronary artery disease) s/p CABG  (11/2015)    Diabetes mellitus, type 2     H/O diastolic dysfunction last Echo 11/2017, EF = 55-60% (stable) -on Indapamide    Hyperlipidemia     Hypertension     Obstructive sleep apnea CPAP at night

## 2019-03-22 NOTE — H&P PST ADULT - NSICDXPROBLEM_GEN_ALL_CORE_FT
PROBLEM DIAGNOSES  Problem: Cholelithiasis  Assessment and Plan: Scheduled laparoscopic cholecystectomy 3/25/2019  Pre-op instructions given to pt, including chlorhexidine soap  Lab work done 3/9/19 in [place]    Problem: H/O diastolic dysfunction  Assessment and Plan: Pt to continue taking Indapamide    Problem: Obstructive sleep apnea  Assessment and Plan: LENARD- OR booking notified     Problem: Hypertension  Assessment and Plan: Instructed pt to take Metoprolol and Losartan in am of sx with sip of water    Problem: Diabetes mellitus, type 2  Assessment and Plan: Instructed pt to hold Humalog DOS. Hold Synjardy, last dose 3/22/19. Pt to take Lantus 24 units night before surgery.   Last HgA1c = 7.0 (1/23/19)   FS DOS    Problem: CAD (coronary artery disease)  Assessment and Plan: Pt to continue low-dose aspirin pre-op

## 2019-03-22 NOTE — H&P PST ADULT - HISTORY OF PRESENT ILLNESS
67 y/o male with PMHx of LENARD (CPAP), T2DM, CAD (s/p CABG x4), HTN, HLD went to [facility] ER 3/9/19 c/o RUQ abdominal pain a/w nausea 69 y/o male with PMHx of LENARD (CPAP), T2DM, CAD (s/p CABG x4, 11/2015), HTN, HLD, diastolic dysfunction (stable), went to [facility] ER 3/9/19 c/o RUQ abdominal pain a/w nausea. Diagnostic imaging revealed cholelithiasis. Evaluated by Dr. Cherry. Presents to [facility] for a scheduled laparoscopic cholecystectomy on 3/25/2019.

## 2019-03-24 ENCOUNTER — TRANSCRIPTION ENCOUNTER (OUTPATIENT)
Age: 69
End: 2019-03-24

## 2019-03-25 ENCOUNTER — APPOINTMENT (OUTPATIENT)
Dept: SURGERY | Facility: HOSPITAL | Age: 69
End: 2019-03-25
Payer: COMMERCIAL

## 2019-03-25 ENCOUNTER — RESULT REVIEW (OUTPATIENT)
Age: 69
End: 2019-03-25

## 2019-03-25 ENCOUNTER — OUTPATIENT (OUTPATIENT)
Dept: OUTPATIENT SERVICES | Facility: HOSPITAL | Age: 69
LOS: 1 days | End: 2019-03-25
Payer: COMMERCIAL

## 2019-03-25 VITALS
TEMPERATURE: 98 F | SYSTOLIC BLOOD PRESSURE: 138 MMHG | RESPIRATION RATE: 16 BRPM | DIASTOLIC BLOOD PRESSURE: 71 MMHG | HEART RATE: 86 BPM | OXYGEN SATURATION: 96 %

## 2019-03-25 VITALS — HEIGHT: 64.17 IN | WEIGHT: 227.08 LBS

## 2019-03-25 DIAGNOSIS — K80.20 CALCULUS OF GALLBLADDER WITHOUT CHOLECYSTITIS WITHOUT OBSTRUCTION: ICD-10-CM

## 2019-03-25 DIAGNOSIS — Z98.89 OTHER SPECIFIED POSTPROCEDURAL STATES: Chronic | ICD-10-CM

## 2019-03-25 DIAGNOSIS — Z98.890 OTHER SPECIFIED POSTPROCEDURAL STATES: Chronic | ICD-10-CM

## 2019-03-25 DIAGNOSIS — Z86.79 PERSONAL HISTORY OF OTHER DISEASES OF THE CIRCULATORY SYSTEM: Chronic | ICD-10-CM

## 2019-03-25 DIAGNOSIS — Z95.1 PRESENCE OF AORTOCORONARY BYPASS GRAFT: Chronic | ICD-10-CM

## 2019-03-25 LAB
GLUCOSE BLDC GLUCOMTR-MCNC: 115 MG/DL — HIGH (ref 70–99)
GLUCOSE BLDC GLUCOMTR-MCNC: 150 MG/DL — HIGH (ref 70–99)

## 2019-03-25 PROCEDURE — 82962 GLUCOSE BLOOD TEST: CPT

## 2019-03-25 PROCEDURE — 47562 LAPAROSCOPIC CHOLECYSTECTOMY: CPT

## 2019-03-25 PROCEDURE — C1889: CPT

## 2019-03-25 RX ORDER — ACETAMINOPHEN 500 MG
1000 TABLET ORAL ONCE
Qty: 0 | Refills: 0 | Status: DISCONTINUED | OUTPATIENT
Start: 2019-03-25 | End: 2019-03-25

## 2019-03-25 RX ORDER — SODIUM CHLORIDE 9 MG/ML
1000 INJECTION, SOLUTION INTRAVENOUS
Qty: 0 | Refills: 0 | Status: DISCONTINUED | OUTPATIENT
Start: 2019-03-25 | End: 2019-04-09

## 2019-03-25 RX ORDER — LIDOCAINE HCL 20 MG/ML
0.2 VIAL (ML) INJECTION ONCE
Qty: 0 | Refills: 0 | Status: DISCONTINUED | OUTPATIENT
Start: 2019-03-25 | End: 2019-03-25

## 2019-03-25 RX ORDER — CEFAZOLIN SODIUM 1 G
2000 VIAL (EA) INJECTION ONCE
Qty: 0 | Refills: 0 | Status: DISCONTINUED | OUTPATIENT
Start: 2019-03-25 | End: 2019-03-25

## 2019-03-25 RX ORDER — ONDANSETRON 8 MG/1
4 TABLET, FILM COATED ORAL ONCE
Qty: 0 | Refills: 0 | Status: DISCONTINUED | OUTPATIENT
Start: 2019-03-25 | End: 2019-03-25

## 2019-03-25 RX ORDER — OXYCODONE HYDROCHLORIDE 5 MG/1
1 TABLET ORAL
Qty: 12 | Refills: 0
Start: 2019-03-25 | End: 2019-03-26

## 2019-03-25 RX ORDER — SODIUM CHLORIDE 9 MG/ML
3 INJECTION INTRAMUSCULAR; INTRAVENOUS; SUBCUTANEOUS EVERY 8 HOURS
Qty: 0 | Refills: 0 | Status: DISCONTINUED | OUTPATIENT
Start: 2019-03-25 | End: 2019-03-25

## 2019-03-25 RX ORDER — HYDROMORPHONE HYDROCHLORIDE 2 MG/ML
0.5 INJECTION INTRAMUSCULAR; INTRAVENOUS; SUBCUTANEOUS
Qty: 0 | Refills: 0 | Status: DISCONTINUED | OUTPATIENT
Start: 2019-03-25 | End: 2019-03-25

## 2019-03-25 NOTE — ASU DISCHARGE PLAN (ADULT/PEDIATRIC) - CARE PROVIDER_API CALL
Pako Cherry)  Surgery  [address], [address]  [place], [place] [id number]  Phone: [phone]  Fax: [phone]  Follow Up Time:

## 2019-03-25 NOTE — BRIEF OPERATIVE NOTE - OPERATION/FINDINGS
Procedure: Laparoscopic cholecystectomy    Findings: A Veress needle was placed in a supraumbilical incision. Abdomen was insufflated. Cystic duct and artery were identified and ligated. Gallbladder was removed. Fascia and skin were then closed.

## 2019-03-25 NOTE — PRE-ANESTHESIA EVALUATION ADULT - NSANTHPMHFT_GEN_ALL_CORE
- per h/p - 67 y/o male with PMHx of LENARD (CPAP), T2DM, CAD (s/p CABG x4, 11/2015), HTN, HLD, diastolic dysfunction (stable), went to [facility] ER 3/9/19 c/o RUQ abdominal pain a/w nausea. Diagnostic imaging revealed cholelithiasis. Evaluated by Dr. Cherry. Presents to [facility] for a scheduled laparoscopic cholecystectomy on 3/25/2019.

## 2019-03-27 LAB — SURGICAL PATHOLOGY STUDY: SIGNIFICANT CHANGE UP

## 2019-04-10 ENCOUNTER — APPOINTMENT (OUTPATIENT)
Dept: SURGERY | Facility: CLINIC | Age: 69
End: 2019-04-10
Payer: COMMERCIAL

## 2019-04-10 VITALS
OXYGEN SATURATION: 97 % | SYSTOLIC BLOOD PRESSURE: 138 MMHG | TEMPERATURE: 98.1 F | RESPIRATION RATE: 15 BRPM | HEART RATE: 71 BPM | DIASTOLIC BLOOD PRESSURE: 81 MMHG

## 2019-04-10 DIAGNOSIS — Z09 ENCOUNTER FOR FOLLOW-UP EXAMINATION AFTER COMPLETED TREATMENT FOR CONDITIONS OTHER THAN MALIGNANT NEOPLASM: ICD-10-CM

## 2019-04-10 DIAGNOSIS — K80.20 CALCULUS OF GALLBLADDER W/OUT CHOLECYSTITIS W/OUT OBSTRUCTION: ICD-10-CM

## 2019-04-10 PROCEDURE — 99024 POSTOP FOLLOW-UP VISIT: CPT

## 2019-04-10 NOTE — HISTORY OF PRESENT ILLNESS
[de-identified] : Ilsa is a 67 y/o male here for post-operative. 3/25/19- laparoscopic cholecystectomy. \par  [de-identified] : Status post laparoscopic cholecystectomy on 3/25/19. At present has no complaint of pain; used no narcotics in the postop period. Tolerating diet well and having regular bowel activity with occasional loose stools but improving.

## 2019-04-10 NOTE — CONSULT LETTER
[Sincerely,] : Sincerely, [Dear  ___] : Dear  [unfilled], [FreeTextEntry2] : Dr. Jose Manuel Wayne [FreeTextEntry1] : I saw Ilsa Samuel back in the office for a postop check on April 10th. Since undergoing his laparoscopic cholecystectomy last month, Mr. Samuel has done well. At today's visit he stated he has been tolerating his diet well and is pain-free, having used no narcotics in the postop period.\par \par On exam, the abdomen was soft, nondistended and nontender and all the trocar sites were noted to be healing quite well. I advised Mr. Samuel that he may continue to gradually liberalize his activities as he feels able and I have discharged him from any further postoperative followup. Thanks once again for allowing me to participate in this pleasant gentleman's care. [FreeTextEntry3] : \par \par Pako Cherry M.D., F.A.C.S.

## 2019-09-16 ENCOUNTER — MEDICATION RENEWAL (OUTPATIENT)
Age: 69
End: 2019-09-16

## 2019-09-23 ENCOUNTER — APPOINTMENT (OUTPATIENT)
Dept: ORTHOPEDIC SURGERY | Facility: CLINIC | Age: 69
End: 2019-09-23
Payer: MEDICARE

## 2019-09-23 VITALS
WEIGHT: 230 LBS | HEIGHT: 65 IN | DIASTOLIC BLOOD PRESSURE: 66 MMHG | SYSTOLIC BLOOD PRESSURE: 116 MMHG | HEART RATE: 77 BPM | BODY MASS INDEX: 38.32 KG/M2

## 2019-09-23 PROCEDURE — 73564 X-RAY EXAM KNEE 4 OR MORE: CPT | Mod: RT

## 2019-09-23 PROCEDURE — 99204 OFFICE O/P NEW MOD 45 MIN: CPT | Mod: 25

## 2019-09-23 PROCEDURE — 20610 DRAIN/INJ JOINT/BURSA W/O US: CPT | Mod: RT

## 2019-09-30 ENCOUNTER — APPOINTMENT (OUTPATIENT)
Dept: ORTHOPEDIC SURGERY | Facility: CLINIC | Age: 69
End: 2019-09-30

## 2019-10-07 ENCOUNTER — APPOINTMENT (OUTPATIENT)
Dept: ORTHOPEDIC SURGERY | Facility: CLINIC | Age: 69
End: 2019-10-07
Payer: MEDICARE

## 2019-10-07 VITALS
WEIGHT: 230 LBS | SYSTOLIC BLOOD PRESSURE: 108 MMHG | DIASTOLIC BLOOD PRESSURE: 66 MMHG | HEIGHT: 65 IN | BODY MASS INDEX: 38.32 KG/M2 | HEART RATE: 79 BPM

## 2019-10-07 PROCEDURE — 20610 DRAIN/INJ JOINT/BURSA W/O US: CPT | Mod: RT

## 2019-10-07 NOTE — PHYSICAL EXAM
[LE] : Sensory: Intact in bilateral lower extremities [Ankle] : ankle 2+ and symmetric bilaterally [Knee] : patellar 2+ and symmetric bilaterally [Plant] : plantar 2+ and symmetric bilaterally [DP] : dorsalis pedis 2+ and symmetric bilaterally [PT] : posterior tibial 2+ and symmetric bilaterally [Normal] : Alert and in no acute distress [Obese] : obese [Poor Appearance] : well-appearing [Acute Distress] : not in acute distress [de-identified] : The patient has no respiratory distress. Mood and affect are normal. The patient is alert and oriented to person, place and time.\par There is no pain with active or passive motion of the hips.  There is no tenderness of either hip.  Examination of the knees demonstrates scar on the superomedial aspect of the right knee.  Quadriceps and hamstring function are intact.  The collateral and cruciate ligaments are stable bilaterally.  There is medial joint line tenderness worse on the right than the left.  Range of motion 15 to 95 degrees right, 0 to 110 degrees left.  The calves are soft and nontender.  The skin is intact.  There is no lymphedema.

## 2019-10-07 NOTE — HISTORY OF PRESENT ILLNESS
[de-identified] : 69 year old male with right knee osteoarthritis presents today for his third Euflexxa injection to the right knee. The previous injections were well tolerated.

## 2019-10-07 NOTE — DISCUSSION/SUMMARY
[de-identified] : Informed consent was obtained. Site and procedure were confirmed with the patient. Following a sterile prep the right knee was aspirated but no fluid was returned. Euflexxa was injected in the right knee without complication. Sterile dressing was applied. Instructions were given.\par He will return as needed.

## 2019-12-09 ENCOUNTER — RX RENEWAL (OUTPATIENT)
Age: 69
End: 2019-12-09

## 2019-12-10 ENCOUNTER — RX RENEWAL (OUTPATIENT)
Age: 69
End: 2019-12-10

## 2019-12-23 ENCOUNTER — APPOINTMENT (OUTPATIENT)
Dept: CARDIOLOGY | Facility: CLINIC | Age: 69
End: 2019-12-23
Payer: MEDICARE

## 2019-12-23 VITALS
BODY MASS INDEX: 37.99 KG/M2 | WEIGHT: 228 LBS | OXYGEN SATURATION: 94 % | DIASTOLIC BLOOD PRESSURE: 87 MMHG | SYSTOLIC BLOOD PRESSURE: 151 MMHG | HEIGHT: 65 IN | HEART RATE: 71 BPM

## 2019-12-23 PROCEDURE — 99214 OFFICE O/P EST MOD 30 MIN: CPT

## 2019-12-23 NOTE — DISCUSSION/SUMMARY
[FreeTextEntry1] : The patient is doing well without any signs or symptoms of active heart disease pars medications blood pressure is well controlled. He is having no chest pain or shortness of breath. I like to review recent ECG and blood work from your office but assuming this is okay he will stay on his present medication I would see him in one year.\par \par We did go over his previous testing, including his prior bypass, stress test, an echo. and I answered all his questions.

## 2019-12-23 NOTE — HISTORY OF PRESENT ILLNESS
[FreeTextEntry1] : I saw Ilsa Samuel in the office today for a follow up visit,. . He is a 68-year-old white male who has diabetes, hypertension, hyperlipidemia, who I saw initially 11/16/15 with symptoms of unstable angina. The patient was sent for cardiac catheterization which demonstrated an ejection fraction of 50%. There was 80% distal left main, 90% ostial circumflex, and 80% OM1. The patient underwent coronary bypass graft surgery on 11/18/15, with LIMA graft to the LAD, and vein graft to the diagonal, OM one, and Posterior lateral artery. The patient returns for a followup visit.\par \par Blood work performed 1/19 demonstrates a cholesterol of 122, HDL 34, and LDL 65, A1c was 7.0. Chemistries and CBC unremarkable.\par \par He was complaining of exertional dyspnea. Underwent an echocardiogram which showed normal ejection fraction without significant valvular heart disease. Stress test showed small to moderate area of partial reversible inferoseptal and inferior wall defect consistent with MI with arianne-infarction ischemia. EF was 46%. On the basis of that test and persistent symptoms he underwent cardiac catheterization. This showed closed native vessels with a patent LIMA graft to the LAD, and patent vein grafts to the D1, OM1, and OM 2. Wedge pressure was 20.RCA was nondominant and non-fixable. . \par \par The patient still complains of dyspnea on exertion which is stable. This is probably a combination of his pulmonary disease, weight, and diastolic dysfunction. Had been seeing Dr. Weil the pulmonologist. he now will be following up with Dr. Madden.\par \par The patient completed the cardiac size program at [place]. He now exercises on his own and has no chest pain. Breathing is slowly improving.\par \par \par

## 2019-12-23 NOTE — REASON FOR VISIT
[Dyspnea] : dyspnea [Initial Evaluation] : an initial evaluation of [Hyperlipidemia] : hyperlipidemia [Hypertension] : hypertension [FreeTextEntry1] :  Diabetes

## 2019-12-23 NOTE — REVIEW OF SYSTEMS
[Eyeglasses] : currently wearing eyeglasses [Shortness Of Breath] : shortness of breath [Chest Pain] : chest pain [Hand Pain] : hand pain [Joint Pain] : joint pain [Knee Pain] : knee pain [Negative] : Genitourinary [Fever] : no fever [Headache] : no headache [Chills] : no chills [Feeling Fatigued] : not feeling fatigued [Blurry Vision] : no blurred vision [Sinus Pressure] : no sinus pressure [Earache] : no earache [Sore Throat] : no sore throat [Seeing Double (Diplopia)] : no diplopia [Lower Ext Edema] : no extremity edema [Chest  Pressure] : no chest pressure [Palpitations] : no palpitations [Skin: A Rash] : no rash: [Leg Claudication] : no intermittent leg claudication [Depression] : no depression [Dizziness] : no dizziness [Anxiety] : no anxiety [Easy Bleeding] : no tendency for easy bleeding [Easy Bruising] : no tendency for easy bruising [Excessive Thirst] : no polydipsia

## 2019-12-23 NOTE — PHYSICAL EXAM
[General Appearance - Well Developed] : well developed [Well Groomed] : well groomed [Normal Appearance] : normal appearance [General Appearance - Well Nourished] : well nourished [General Appearance - In No Acute Distress] : no acute distress [No Deformities] : no deformities [Normal Conjunctiva] : the conjunctiva exhibited no abnormalities [Normal Jugular Venous A Waves Present] : normal jugular venous A waves present [Normal Oral Mucosa] : normal oral mucosa [Normal Jugular Venous V Waves Present] : normal jugular venous V waves present [No Jugular Venous Soto A Waves] : no jugular venous soto A waves [Exaggerated Use Of Accessory Muscles For Inspiration] : no accessory muscle use [Respiration, Rhythm And Depth] : normal respiratory rhythm and effort [Auscultation Breath Sounds / Voice Sounds] : lungs were clear to auscultation bilaterally [Abdomen Soft] : soft [Bowel Sounds] : normal bowel sounds [Abdomen Tenderness] : non-tender [Gait - Sufficient For Exercise Testing] : the gait was sufficient for exercise testing [Abnormal Walk] : normal gait [Cyanosis, Localized] : no localized cyanosis [Nail Clubbing] : no clubbing of the fingernails [Skin Color & Pigmentation] : normal skin color and pigmentation [] : no rash [Skin Turgor] : normal skin turgor [Oriented To Time, Place, And Person] : oriented to person, place, and time [No Anxiety] : not feeling anxious [Impaired Insight] : insight and judgment were intact [Normal Rate] : normal [Not Palpable] : not palpable [Normal S1] : normal S1 [Normal S2] : normal S2 [No Murmur] : no murmurs heard [2+] : left 2+ [1+] : left 1+ [No Pitting Edema] : no pitting edema present [No Abnormalities] : the abdominal aorta was not enlarged and no bruit was heard [S4] : no S4 [S3] : no S3 [Left Carotid Bruit] : no bruit heard over the left carotid [Right Carotid Bruit] : no bruit heard over the right carotid [Right Femoral Bruit] : no bruit heard over the right femoral artery [Left Femoral Bruit] : no bruit heard over the left femoral artery

## 2020-09-15 NOTE — ED PROVIDER NOTE - GASTROINTESTINAL [+], MLM
NAUSEA/ABDOMINAL PAIN VITAL SIGNS: I have reviewed nursing notes and confirm.  CONSTITUTIONAL: Well-developed; well-nourished; in no acute distress.  SKIN: Skin exam is warm and dry, no acute rash.  HEAD: Normocephalic; atraumatic.  EYES: PERRL, EOM intact; conjunctiva and sclera clear.  ENT: No nasal discharge; airway clear.   NECK: Supple; non tender.  CARD: S1, S2 normal; no murmurs, gallops, or rubs. Regular rate and rhythm.  RESP: No wheezes, rales or rhonchi. Speaking in full sentences.   ABD: Normal bowel sounds; soft; non-distended; non-tender; No rebound or guarding. No CVA tenderness.  EXT: Normal ROM. No clubbing, cyanosis or edema. (+) mild TTP to right palm between 4-5 MCP, no skin changes, no swelling, no abscess; CR < 2 seconds.   NEURO: Alert, oriented. Grossly unremarkable. No focal deficits. CN II-XII intact. No dysmetria. No ataxia. Sensation intact and equal throughout. Strength 5/5 throughout. Gait steady.

## 2020-11-12 ENCOUNTER — APPOINTMENT (OUTPATIENT)
Dept: ORTHOPEDIC SURGERY | Facility: CLINIC | Age: 70
End: 2020-11-12
Payer: MEDICARE

## 2020-11-12 VITALS
TEMPERATURE: 97 F | SYSTOLIC BLOOD PRESSURE: 124 MMHG | DIASTOLIC BLOOD PRESSURE: 74 MMHG | HEIGHT: 65 IN | WEIGHT: 220 LBS | HEART RATE: 82 BPM | BODY MASS INDEX: 36.65 KG/M2

## 2020-11-12 PROCEDURE — 99213 OFFICE O/P EST LOW 20 MIN: CPT

## 2020-11-12 PROCEDURE — 73564 X-RAY EXAM KNEE 4 OR MORE: CPT | Mod: 50

## 2020-11-12 NOTE — HISTORY OF PRESENT ILLNESS
[de-identified] : 70 year old male with bilateral knee arthritis presents today for follow up. He complains of worsening pain, R>L. He is having difficulty walking. He is unable to walk more than 1/2 block. He has difficulty gardening and doing work around the house. He was treated with Euflexxa one year ago which provided only a few days of pain relief. He takes Aleve as needed with minimal relief. He is considering TKR for the right knee. \par

## 2020-11-12 NOTE — PHYSICAL EXAM
[LE] : Sensory: Intact in bilateral lower extremities [Knee] : patellar 2+ and symmetric bilaterally [Ankle] : ankle 2+ and symmetric bilaterally [Plant] : plantar 2+ and symmetric bilaterally [DP] : dorsalis pedis 2+ and symmetric bilaterally [PT] : posterior tibial 2+ and symmetric bilaterally [Normal] : Alert and in no acute distress [Poor Appearance] : well-appearing [Acute Distress] : not in acute distress [Obese] : obese [de-identified] : The patient has no respiratory distress. Mood and affect are normal. The patient is alert and oriented to person, place and time.\par There is no pain with active or passive motion of the hips.  There is no tenderness of either hip.  Examination of the knees demonstrates scar on the superomedial aspect of the right knee.  Quadriceps and hamstring function are intact.  The collateral and cruciate ligaments are stable bilaterally.  There is medial joint line tenderness worse on the right than the left.  Range of motion 15 to 95 degrees right, 0 to 110 degrees left.  The calves are soft and nontender.  The skin is intact.  There is no lymphedema. [de-identified] : AP, lateral, tunnel and sunrise x-rays of both knees taken today demonstrate moderate to severe tricompartmental osteoarthritis.  There is osteophyte formation and loss of joint space.

## 2020-11-12 NOTE — DISCUSSION/SUMMARY
[de-identified] : The patient has symptomatic osteoarthritis of both knees.  The right knee is more painful than the left.  The pathology, natural history and treatment options were discussed in detail.  He is a candidate for total knee replacement when he is ready.  At this point he would like to try a course of physical therapy and topical ointments.  He will return in 6 weeks.

## 2020-11-23 ENCOUNTER — RX RENEWAL (OUTPATIENT)
Age: 70
End: 2020-11-23

## 2020-12-16 ENCOUNTER — RX RENEWAL (OUTPATIENT)
Age: 70
End: 2020-12-16

## 2021-01-11 ENCOUNTER — APPOINTMENT (OUTPATIENT)
Dept: CARDIOLOGY | Facility: CLINIC | Age: 71
End: 2021-01-11
Payer: MEDICARE

## 2021-01-11 ENCOUNTER — NON-APPOINTMENT (OUTPATIENT)
Age: 71
End: 2021-01-11

## 2021-01-11 VITALS — SYSTOLIC BLOOD PRESSURE: 122 MMHG | OXYGEN SATURATION: 96 % | DIASTOLIC BLOOD PRESSURE: 76 MMHG | HEART RATE: 75 BPM

## 2021-01-11 VITALS — HEIGHT: 65 IN | WEIGHT: 234 LBS | BODY MASS INDEX: 38.99 KG/M2

## 2021-01-11 PROCEDURE — 99214 OFFICE O/P EST MOD 30 MIN: CPT

## 2021-01-11 PROCEDURE — 93000 ELECTROCARDIOGRAM COMPLETE: CPT

## 2021-01-11 NOTE — DISCUSSION/SUMMARY
[FreeTextEntry1] : The patient's blood pressure, and diabetes are well controlled. Unfortunately he has not been able to lose weight. His LDL cholesterol still is a little high at 80.  I'm going to add Zetia 10 mg once a day to the atorvastatin. We did discuss the importance of exercise, diet, and weight control. Hopefully over time he can lose some weight. This would help his parameters including his cholesterol.\par \par We discussed his medication and his blood work, I answered all of his questions.\par \par Patient will schedule a follow up carotid Doppler to check on his peripheral vascular disease and echocardiogram check on the heart structurally. I will discuss these tests with the patient when available and decide if any change in medication would be appropriate.\par \par He should have his blood rechecked in 3 months on Zetia, and if all is well I will see him in 6 months to see what progress he has made.

## 2021-01-11 NOTE — PHYSICAL EXAM
[General Appearance - Well Developed] : well developed [Normal Appearance] : normal appearance [Well Groomed] : well groomed [General Appearance - Well Nourished] : well nourished [No Deformities] : no deformities [General Appearance - In No Acute Distress] : no acute distress [Normal Conjunctiva] : the conjunctiva exhibited no abnormalities [Normal Oral Mucosa] : normal oral mucosa [Normal Jugular Venous A Waves Present] : normal jugular venous A waves present [Normal Jugular Venous V Waves Present] : normal jugular venous V waves present [No Jugular Venous Soto A Waves] : no jugular venous soto A waves [Respiration, Rhythm And Depth] : normal respiratory rhythm and effort [Exaggerated Use Of Accessory Muscles For Inspiration] : no accessory muscle use [Auscultation Breath Sounds / Voice Sounds] : lungs were clear to auscultation bilaterally [Bowel Sounds] : normal bowel sounds [Abdomen Soft] : soft [Abdomen Tenderness] : non-tender [Abnormal Walk] : normal gait [Gait - Sufficient For Exercise Testing] : the gait was sufficient for exercise testing [Nail Clubbing] : no clubbing of the fingernails [Cyanosis, Localized] : no localized cyanosis [Skin Color & Pigmentation] : normal skin color and pigmentation [Skin Turgor] : normal skin turgor [] : no rash [Oriented To Time, Place, And Person] : oriented to person, place, and time [No Anxiety] : not feeling anxious [Impaired Insight] : insight and judgment were intact [Not Palpable] : not palpable [Normal Rate] : normal [Normal S1] : normal S1 [Normal S2] : normal S2 [No Murmur] : no murmurs heard [2+] : left 2+ [1+] : left 1+ [No Abnormalities] : the abdominal aorta was not enlarged and no bruit was heard [No Pitting Edema] : no pitting edema present [S3] : no S3 [S4] : no S4 [Right Carotid Bruit] : no bruit heard over the right carotid [Left Carotid Bruit] : no bruit heard over the left carotid [Right Femoral Bruit] : no bruit heard over the right femoral artery [Left Femoral Bruit] : no bruit heard over the left femoral artery

## 2021-01-11 NOTE — HISTORY OF PRESENT ILLNESS
[FreeTextEntry1] : I saw Ilsa Samuel in the office today for a follow up visit,. . He is a 70-year-old white male who has diabetes, hypertension, hyperlipidemia, who I saw initially 11/16/15 with symptoms of unstable angina. The patient was sent for cardiac catheterization which demonstrated an ejection fraction of 50%. There was 80% distal left main, 90% ostial circumflex, and 80% OM1. The patient underwent coronary bypass graft surgery on 11/18/15, with LIMA graft to the LAD, and vein graft to the diagonal, OM one, and Posterior lateral artery. The patient returns for a followup visit.\par \par Blood work 12/20 demonstrated a total cholesterol 146, triglycerides 80, HDL 44, and LDL 80. A1c was 6.5.\par \par He was complaining of exertional dyspnea. Underwent an echocardiogram which showed normal ejection fraction without significant valvular heart disease. Stress test showed small to moderate area of partial reversible inferoseptal and inferior wall defect consistent with MI with arianne-infarction ischemia. EF was 46%. On the basis of that test and persistent symptoms he underwent cardiac catheterization. This showed closed native vessels with a patent LIMA graft to the LAD, and patent vein grafts to the D1, OM1, and OM 2. Wedge pressure was 20.RCA was nondominant and non-fixable. . \par \par The patient still complains of dyspnea on exertion which is stable. This is probably a combination of his pulmonary disease, weight, and diastolic dysfunction. Had been seeing Dr. Weil the pulmonologist. he now will be following up with Dr. Madden.\par \par The patient completed the cardiac exercise program at [place]. He now exercises on his own and has no chest pain. Breathing is slowly improving. He has not been able to lose any weight.\par \par ECG demonstrates sinus rhythm without change.\par \par \par

## 2021-01-25 ENCOUNTER — NON-APPOINTMENT (OUTPATIENT)
Age: 71
End: 2021-01-25

## 2021-01-25 ENCOUNTER — TRANSCRIPTION ENCOUNTER (OUTPATIENT)
Age: 71
End: 2021-01-25

## 2021-02-02 ENCOUNTER — RX CHANGE (OUTPATIENT)
Age: 71
End: 2021-02-02

## 2021-02-16 ENCOUNTER — APPOINTMENT (OUTPATIENT)
Dept: CARDIOLOGY | Facility: CLINIC | Age: 71
End: 2021-02-16
Payer: MEDICARE

## 2021-02-16 PROCEDURE — 93306 TTE W/DOPPLER COMPLETE: CPT

## 2021-02-16 PROCEDURE — 93880 EXTRACRANIAL BILAT STUDY: CPT

## 2021-02-22 ENCOUNTER — NON-APPOINTMENT (OUTPATIENT)
Age: 71
End: 2021-02-22

## 2021-03-11 ENCOUNTER — RX RENEWAL (OUTPATIENT)
Age: 71
End: 2021-03-11

## 2021-05-13 ENCOUNTER — APPOINTMENT (OUTPATIENT)
Dept: RADIOLOGY | Facility: CLINIC | Age: 71
End: 2021-05-13
Payer: MEDICARE

## 2021-05-13 ENCOUNTER — OUTPATIENT (OUTPATIENT)
Dept: OUTPATIENT SERVICES | Facility: HOSPITAL | Age: 71
LOS: 1 days | End: 2021-05-13
Payer: MEDICARE

## 2021-05-13 DIAGNOSIS — Z98.89 OTHER SPECIFIED POSTPROCEDURAL STATES: Chronic | ICD-10-CM

## 2021-05-13 DIAGNOSIS — Z98.890 OTHER SPECIFIED POSTPROCEDURAL STATES: Chronic | ICD-10-CM

## 2021-05-13 DIAGNOSIS — M51.36 OTHER INTERVERTEBRAL DISC DEGENERATION, LUMBAR REGION: ICD-10-CM

## 2021-05-13 DIAGNOSIS — Z95.1 PRESENCE OF AORTOCORONARY BYPASS GRAFT: Chronic | ICD-10-CM

## 2021-05-13 DIAGNOSIS — Z86.79 PERSONAL HISTORY OF OTHER DISEASES OF THE CIRCULATORY SYSTEM: Chronic | ICD-10-CM

## 2021-05-13 PROCEDURE — 72050 X-RAY EXAM NECK SPINE 4/5VWS: CPT | Mod: 26

## 2021-05-13 PROCEDURE — 72070 X-RAY EXAM THORAC SPINE 2VWS: CPT | Mod: 26

## 2021-05-13 PROCEDURE — 72070 X-RAY EXAM THORAC SPINE 2VWS: CPT

## 2021-05-13 PROCEDURE — 72110 X-RAY EXAM L-2 SPINE 4/>VWS: CPT | Mod: 26

## 2021-05-13 PROCEDURE — 72050 X-RAY EXAM NECK SPINE 4/5VWS: CPT

## 2021-05-13 PROCEDURE — 72110 X-RAY EXAM L-2 SPINE 4/>VWS: CPT

## 2021-07-06 ENCOUNTER — APPOINTMENT (OUTPATIENT)
Dept: CARDIOLOGY | Facility: CLINIC | Age: 71
End: 2021-07-06
Payer: MEDICARE

## 2021-07-06 VITALS
DIASTOLIC BLOOD PRESSURE: 70 MMHG | SYSTOLIC BLOOD PRESSURE: 113 MMHG | HEART RATE: 89 BPM | WEIGHT: 231 LBS | OXYGEN SATURATION: 96 % | BODY MASS INDEX: 38.49 KG/M2 | HEIGHT: 65 IN

## 2021-07-06 PROCEDURE — 99214 OFFICE O/P EST MOD 30 MIN: CPT

## 2021-07-06 NOTE — REVIEW OF SYSTEMS
[Joint Pain] : joint pain [Hand Pain] : hand pain [Knee Pain] : knee pain [Negative] : Genitourinary [Rash] : no rash [Dizziness] : no dizziness [Depression] : no depression [Anxiety] : no anxiety [Easy Bleeding] : no tendency for easy bleeding [Easy Bruising] : no tendency for easy bruising

## 2021-07-06 NOTE — HISTORY OF PRESENT ILLNESS
[FreeTextEntry1] : I saw Ilsa Samuel in the office today for a follow up visit,. . He is a 71-year-old white male who has diabetes, hypertension, hyperlipidemia, who I saw initially 11/16/15 with symptoms of unstable angina. The patient was sent for cardiac catheterization which demonstrated an ejection fraction of 50%. There was 80% distal left main, 90% ostial circumflex, and 80% OM1. The patient underwent coronary bypass graft surgery on 11/18/15, with LIMA graft to the LAD, and vein graft to the diagonal, OM one, and Posterior lateral artery. The patient returns for a followup visit.\par \par Blood work performed/21 demonstrated a cholesterol of 88, triglycerides 83, HDL 36, and LDL 35.  A1c was 6.4.  This is on a combination of atorvastatin and Zetia.\par \par He was complaining of exertional dyspnea. Underwent an echocardiogram which showed normal ejection fraction without significant valvular heart disease. Stress test showed small to moderate area of partial reversible inferoseptal and inferior wall defect consistent with MI with arianne-infarction ischemia. EF was 46%. On the basis of that test and persistent symptoms he underwent cardiac catheterization. This showed closed native vessels with a patent LIMA graft to the LAD, and patent vein grafts to the D1, OM1, and OM 2. Wedge pressure was 20.RCA was nondominant and non-fixable. . \par \par The patient still complains of dyspnea on exertion which is stable. This is probably a combination of his pulmonary disease, weight, and diastolic dysfunction. Had been seeing Dr. Weil the pulmonologist. he now will be following up with Dr. Madden.\par \par Repeat Echo 2/21 demonstrated EF 58%, mild TR. Carotd doppler showed mild plaque.\par \par The patient completed the cardiac exercise program at [place]. He now exercises on his own and has no chest pain. Breathing is slowly improving. He has not been able to lose any weight.  His ability to exercise is severely limited by back pain.  He is presently receiving physical therapy.\par \par \par \par \par

## 2021-07-06 NOTE — PHYSICAL EXAM
SUBJECTIVE:   Oleg De La O is a 19 year old male who presents to clinic today with mother because of:    Chief Complaint   Patient presents with     Depression     Anxiety        HPI  Concerns: Depression & anxiety                SUBJECTIVE:  Oleg De La O is a 19 year old male who presents for evaluation and treatment of depressive symptoms. Current symptoms include depressed mood, hopelessness, diminished interest or pleasure in activities, insomnia, fatigue, feelings of worthlessness, feelings of guilt, difficulty with concentration, anxiety, sleep disturbance, difficulty falling asleep. Onset approximately 3-4  months ago; symptoms have been gradually worsening since that time.  Family history is positive for depression in the patient s mother. Previous treatment he has tried include none.   Depression risk factors: positive family history in patient's mother  Organic causes of depression present: none    Patient Active Problem List   Diagnosis     No active medical problems     Right shoulder pain     Glenoid labral tear     Concussion, without loss of consciousness, subsequent encounter     Moderate episode of recurrent major depressive disorder (H)     Anxiety     Current Outpatient Prescriptions   Medication Sig     NO ACTIVE MEDICATIONS .     citalopram (CELEXA) 10 MG tablet TAKE ONE TABLET BY MOUTH ONCE DAILY     ibuprofen (ADVIL/MOTRIN) 200 MG tablet Take 3 tablets (600 mg) by mouth every 6 hours as needed for mild pain or fever (Patient not taking: Reported on 5/19/2017)     Acetaminophen (TYLENOL PO) Reported on 5/19/2017     No current facility-administered medications for this visit.      Social History     Social History     Marital status: Single     Spouse name: N/A     Number of children: N/A     Years of education: N/A     Occupational History     Not on file.     Social History Main Topics     Smoking status: Never Smoker     Smokeless tobacco: Never Used     Alcohol use No     Drug  "use: No     Sexual activity: No      Comment: leidy feb [email or web address]     Other Topics Concern     Not on file     Social History Narrative          REVIEW OF SYSTEMS:       OBJECTIVE:  Vitals:    01/26/18 1315   BP: 111/74   BP Location: Right arm   Patient Position: Sitting   Cuff Size: Adult Regular   Pulse: 60   Temp: 97.7  F (36.5  C)   TempSrc: Oral   SpO2: 100%   Weight: 171 lb (77.6 kg)   Height: 5' 11\" (1.803 m)     General: Well nourished, well developed without apparent distress, healthy, alert, active, no distress  Skin: good color,and perfusion  no rash  Head: atraumaticnormocephali  Eye: no redness or d/c  HENT: NEGATIVE for nose,mouth without ulcers or lesions, TM's pearly grey, normal light reflex bilateral, oral mucous membranes moist and oropharynx clear.  Chest/Lungs: clear  CV: Negative  Abdomen: NEGATIVE for bowel sounds normal, liver span normal to percussion and soft, non-tender      Mental Status Examination:   Posture and motor behavior: normal   Dress, grooming, personal hygiene: normal   Facial expression: normal   Speech: positive for slow   Mood: positive for depressed   Coherency and relevance of thought: normal   Thought content: normal   Perceptions: normal   Orientation: normal   Attention and concentration: normal   Memory: : normal   Information: normal   Vocabulary: normal   Abstract reasoning: normal   Judgment: normal     .mgxbrief    ASSESSMENT:   Major depression - with anxiety      PLAN:   per [facility] orders.   Reviewed concept of depression as biochemical imbalance of   neurotransmitters and rationale for treatment. Instructed patient   to contact office or on-call physician promptly should condition   worsen or any new symptoms appear and provided on-call telephone   numbers.  Psychology consult for psychotherapy.   .RTC        " [General Appearance - Well Developed] : well developed [Normal Appearance] : normal appearance [Well Groomed] : well groomed [General Appearance - Well Nourished] : well nourished [No Deformities] : no deformities [General Appearance - In No Acute Distress] : no acute distress [Normal Conjunctiva] : the conjunctiva exhibited no abnormalities [Normal Oral Mucosa] : normal oral mucosa [Normal Jugular Venous A Waves Present] : normal jugular venous A waves present [Normal Jugular Venous V Waves Present] : normal jugular venous V waves present [No Jugular Venous Soto A Waves] : no jugular venous soto A waves [Respiration, Rhythm And Depth] : normal respiratory rhythm and effort [Exaggerated Use Of Accessory Muscles For Inspiration] : no accessory muscle use [Auscultation Breath Sounds / Voice Sounds] : lungs were clear to auscultation bilaterally [Bowel Sounds] : normal bowel sounds [Abdomen Soft] : soft [Abdomen Tenderness] : non-tender [Abnormal Walk] : normal gait [Gait - Sufficient For Exercise Testing] : the gait was sufficient for exercise testing [Nail Clubbing] : no clubbing of the fingernails [Cyanosis, Localized] : no localized cyanosis [Skin Color & Pigmentation] : normal skin color and pigmentation [Skin Turgor] : normal skin turgor [] : no rash [Oriented To Time, Place, And Person] : oriented to person, place, and time [Impaired Insight] : insight and judgment were intact [No Anxiety] : not feeling anxious [Not Palpable] : not palpable [Normal Rate] : normal [Normal S1] : normal S1 [Normal S2] : normal S2 [No Murmur] : no murmurs heard [2+] : left 2+ [1+] : left 1+ [No Abnormalities] : the abdominal aorta was not enlarged and no bruit was heard [No Pitting Edema] : no pitting edema present [S3] : no S3 [S4] : no S4 [Right Carotid Bruit] : no bruit heard over the right carotid [Left Carotid Bruit] : no bruit heard over the left carotid [Right Femoral Bruit] : no bruit heard over the right femoral artery [Left Femoral Bruit] : no bruit heard over the left femoral artery

## 2021-07-06 NOTE — DISCUSSION/SUMMARY
[FreeTextEntry1] : The patient's cardiac status is stable.  Clinically he is having no signs or symptoms of angina or congestive heart failure.  He tries to do as much exercise as he can but unfortunately has been unable to lose weight.  On his medication his blood pressure and cholesterol and diabetes are under good control.\par \par He will stay on his present medication.  Did discuss his recent carotid Doppler and echo, and went over his blood work.  I answered his questions.\par \par Continue physical therapy.  Needs to find some exercise he can do that will burn calories and allow him to lose weight.  If all is well I will see him in 6 months.

## 2021-11-05 ENCOUNTER — RX RENEWAL (OUTPATIENT)
Age: 71
End: 2021-11-05

## 2021-12-06 ENCOUNTER — RX RENEWAL (OUTPATIENT)
Age: 71
End: 2021-12-06

## 2022-02-01 ENCOUNTER — NON-APPOINTMENT (OUTPATIENT)
Age: 72
End: 2022-02-01

## 2022-02-07 DIAGNOSIS — Z00.00 ENCOUNTER FOR GENERAL ADULT MEDICAL EXAMINATION W/OUT ABNORMAL FINDINGS: ICD-10-CM

## 2022-02-11 ENCOUNTER — APPOINTMENT (OUTPATIENT)
Dept: ORTHOPEDIC SURGERY | Facility: CLINIC | Age: 72
End: 2022-02-11
Payer: MEDICARE

## 2022-02-11 PROCEDURE — 73564 X-RAY EXAM KNEE 4 OR MORE: CPT | Mod: RT

## 2022-02-11 PROCEDURE — 73560 X-RAY EXAM OF KNEE 1 OR 2: CPT | Mod: LT

## 2022-02-11 PROCEDURE — 99214 OFFICE O/P EST MOD 30 MIN: CPT

## 2022-02-11 NOTE — HISTORY OF PRESENT ILLNESS
[de-identified] : This is very nice 71-year-old gentleman experiencing chronic bilateral knee pain, which is severe in intensity.  The right knee slightly more painful than the left the pain substantially limits activities of daily living. Walking tolerance is reduced. Medication including Aleve, hyaluronic acid injections and physical therapy and activity modification have been minimally effective for a period lasting greater than three months in duration. Assistive devices and external support were not deemed by the patient to be helpful in improving their function. Due to the severity of osteoarthritis and level of pain, physical therapy is contraindicated. Pain and restriction of function are intolerable at this time. The patient denies any radiation of the pain to the feet and it is not associated with numbness, tingling, or weakness.  He cannot have prescription NSAIDs because he has a history of quadruple bypass.  History of sleep apnea.  Diabetes well controlled.

## 2022-02-11 NOTE — PHYSICAL EXAM
[de-identified] : Patient is well nourished, well-developed, in no acute distress, with appropriate mood and affect. The patient is oriented to time, place, and person. Respirations are even and unlabored. Gait evaluation does reveal a limp. There is no inguinal adenopathy. Bilateral limbs are well-perfused, without skin lesions, shows a grossly normal motor and sensory examination. The right knee motion is significantly reduced and does cause significant pain. The right knee moves from 5-115 degrees. The knee is stable within that range-of-motion to AP and ML stress. The alignment of the knee is 5 degrees varus. Muscle strength is normal. Pedal pulses are palpable. Hip examination was negative. The left knee motion is significantly reduced and does cause significant pain. The left knee moves from 0 to 125 degrees. The knee is stable within that range-of-motion to AP and ML stress. The alignment of the knee is 5 degrees varus. Muscle strength is normal. Pedal pulses are palpable. Hip examination was negative. [de-identified] : Long standing knee, AP knee, lateral knee, and patellar views of the right knee were ordered and taken in the office and demonstrate degenerative joint disease of the knee with joint space narrowing, osteophyte formation, and subchondral sclerosis.  AP radiograph of the left knee was ordered obtained the office demonstrates left knee osteoarthritis.

## 2022-02-11 NOTE — DISCUSSION/SUMMARY
[de-identified] : This patient is severe bilateral knee osteoarthritis.  The right knee slightly more painful than the left.  Is failed a course of conservative management would like to proceed with a right total knee arthroplasty using robotic navigation for assistance.  After allowing a minimum of 3 months for healing he would then like to proceed with a staged left total knee arthroplasty.\par \par The patient is an appropriate candidate for consideration of right total knee replacement. An extensive discussion was conducted of the natural history of the disease and the variety of surgical and non-surgical treatment options available to the patient. A risk/benefit analysis was discussed with the patient reviewing the advantages and disadvantages of surgical intervention at this time. Both the level and length of the patient's pain have made additional conservative treatment measures consisting of NSAIDs, physical therapy, corticosteroids, and/or viscosupplementation contraindicated. A full explanation was given of the nature and the purpose of the procedure and anesthesia, its benefits, possible alternative methods of diagnosis or treatment, the risks involved, the possibility of complications, the foreseeable consequences of the procedure and the possible results of the non-treatment. I reviewed the plan of care as well as used a model of a total knee implant equivalent to the one that will be used for their total knee joint replacement. The ability to secure the implant utilizing cement or cementless (press-fit) was discussed with the patient. The patient agrees with the plan of care, as well as the use of implants for their total knee replacement.   We also discussed that if robotic/computer navigation is utilized, then additional incisions may need to be made to accommodate the computer navigation arrays, which will be placed in the femur and tibia.\par \par No guarantee or assurance was made as to the results that may be obtained. Specifically, the risks were identified to include, but are not limited to the following: Infection, phlebitis, pulmonary embolism, death, paralysis, dislocation, pain, stiffness, instability, limp, weakness, breakage, leg-length inequality, uncontrolled bleeding, nerve injury, blood vessel injury, pressure sores, anesthetic risks, delayed healing of wound and bone, and wear and loosening. Further discussion was undertaken with the patient about the details of surgical preparation, treatment, and postoperative rehabilitation including medical clearance, autotransfusion, the hospital course, and the postoperative rehabilitation involved. All in all, I feel that this patient is a good candidate for surgical reconstruction.\par \par The patient and I discussed the current SARS-CoV-2 (COVID-19) pandemic which has affected our local hospitals. We discussed that our hospitals treat patients with COVID-19. All efforts will be made to avoid cohorting the patient with diagnosed or suspected COVID-19 patient. They also understand that we will screen them 24-48 hours prior to surgery. Despite our best efforts, there is a potential risk for iatrogenic transmission of COVID-19 to the patient during the perioperative period. Sandra COVID-19 during the perioperative period may increase the patient´s risks of an adverse outcome including postoperative pneumonia, difficulty breathing, requirement for a breathing tube (general endotracheal intubation), and death. The patient is understanding of this risk, and is willing to proceed with surgery at this time.

## 2022-03-04 ENCOUNTER — RX RENEWAL (OUTPATIENT)
Age: 72
End: 2022-03-04

## 2022-03-23 ENCOUNTER — RX RENEWAL (OUTPATIENT)
Age: 72
End: 2022-03-23

## 2022-04-04 ENCOUNTER — OUTPATIENT (OUTPATIENT)
Dept: OUTPATIENT SERVICES | Facility: HOSPITAL | Age: 72
LOS: 1 days | End: 2022-04-04
Payer: MEDICARE

## 2022-04-04 VITALS
TEMPERATURE: 98 F | SYSTOLIC BLOOD PRESSURE: 144 MMHG | HEART RATE: 91 BPM | OXYGEN SATURATION: 96 % | RESPIRATION RATE: 16 BRPM | DIASTOLIC BLOOD PRESSURE: 79 MMHG | HEIGHT: 65 IN | WEIGHT: 231.93 LBS

## 2022-04-04 DIAGNOSIS — I25.10 ATHEROSCLEROTIC HEART DISEASE OF NATIVE CORONARY ARTERY WITHOUT ANGINA PECTORIS: ICD-10-CM

## 2022-04-04 DIAGNOSIS — M17.11 UNILATERAL PRIMARY OSTEOARTHRITIS, RIGHT KNEE: ICD-10-CM

## 2022-04-04 DIAGNOSIS — G47.33 OBSTRUCTIVE SLEEP APNEA (ADULT) (PEDIATRIC): ICD-10-CM

## 2022-04-04 DIAGNOSIS — Z90.49 ACQUIRED ABSENCE OF OTHER SPECIFIED PARTS OF DIGESTIVE TRACT: Chronic | ICD-10-CM

## 2022-04-04 DIAGNOSIS — Z98.890 OTHER SPECIFIED POSTPROCEDURAL STATES: Chronic | ICD-10-CM

## 2022-04-04 DIAGNOSIS — E11.319 TYPE 2 DIABETES MELLITUS WITH UNSPECIFIED DIABETIC RETINOPATHY WITHOUT MACULAR EDEMA: ICD-10-CM

## 2022-04-04 DIAGNOSIS — Z95.1 PRESENCE OF AORTOCORONARY BYPASS GRAFT: Chronic | ICD-10-CM

## 2022-04-04 DIAGNOSIS — Z98.89 OTHER SPECIFIED POSTPROCEDURAL STATES: Chronic | ICD-10-CM

## 2022-04-04 DIAGNOSIS — Z86.79 PERSONAL HISTORY OF OTHER DISEASES OF THE CIRCULATORY SYSTEM: Chronic | ICD-10-CM

## 2022-04-04 DIAGNOSIS — Z01.818 ENCOUNTER FOR OTHER PREPROCEDURAL EXAMINATION: ICD-10-CM

## 2022-04-04 LAB
ANION GAP SERPL CALC-SCNC: 16 MMOL/L — SIGNIFICANT CHANGE UP (ref 5–17)
BLD GP AB SCN SERPL QL: NEGATIVE — SIGNIFICANT CHANGE UP
BUN SERPL-MCNC: 13 MG/DL — SIGNIFICANT CHANGE UP (ref 7–23)
CALCIUM SERPL-MCNC: 10.1 MG/DL — SIGNIFICANT CHANGE UP (ref 8.4–10.5)
CHLORIDE SERPL-SCNC: 100 MMOL/L — SIGNIFICANT CHANGE UP (ref 96–108)
CO2 SERPL-SCNC: 25 MMOL/L — SIGNIFICANT CHANGE UP (ref 22–31)
CREAT SERPL-MCNC: 0.72 MG/DL — SIGNIFICANT CHANGE UP (ref 0.5–1.3)
EGFR: 98 ML/MIN/1.73M2 — SIGNIFICANT CHANGE UP
GLUCOSE SERPL-MCNC: 171 MG/DL — HIGH (ref 70–99)
HCT VFR BLD CALC: 43.2 % — SIGNIFICANT CHANGE UP (ref 39–50)
HGB BLD-MCNC: 13.5 G/DL — SIGNIFICANT CHANGE UP (ref 13–17)
MCHC RBC-ENTMCNC: 27.7 PG — SIGNIFICANT CHANGE UP (ref 27–34)
MCHC RBC-ENTMCNC: 31.3 GM/DL — LOW (ref 32–36)
MCV RBC AUTO: 88.5 FL — SIGNIFICANT CHANGE UP (ref 80–100)
NRBC # BLD: 0 /100 WBCS — SIGNIFICANT CHANGE UP (ref 0–0)
PLATELET # BLD AUTO: 284 K/UL — SIGNIFICANT CHANGE UP (ref 150–400)
POTASSIUM SERPL-MCNC: 3.7 MMOL/L — SIGNIFICANT CHANGE UP (ref 3.5–5.3)
POTASSIUM SERPL-SCNC: 3.7 MMOL/L — SIGNIFICANT CHANGE UP (ref 3.5–5.3)
RBC # BLD: 4.88 M/UL — SIGNIFICANT CHANGE UP (ref 4.2–5.8)
RBC # FLD: 15.1 % — HIGH (ref 10.3–14.5)
RH IG SCN BLD-IMP: POSITIVE — SIGNIFICANT CHANGE UP
SODIUM SERPL-SCNC: 141 MMOL/L — SIGNIFICANT CHANGE UP (ref 135–145)
WBC # BLD: 7.42 K/UL — SIGNIFICANT CHANGE UP (ref 3.8–10.5)
WBC # FLD AUTO: 7.42 K/UL — SIGNIFICANT CHANGE UP (ref 3.8–10.5)

## 2022-04-04 PROCEDURE — G1004: CPT

## 2022-04-04 PROCEDURE — 86901 BLOOD TYPING SEROLOGIC RH(D): CPT

## 2022-04-04 PROCEDURE — 86850 RBC ANTIBODY SCREEN: CPT

## 2022-04-04 PROCEDURE — 83036 HEMOGLOBIN GLYCOSYLATED A1C: CPT

## 2022-04-04 PROCEDURE — 87640 STAPH A DNA AMP PROBE: CPT

## 2022-04-04 PROCEDURE — 87641 MR-STAPH DNA AMP PROBE: CPT

## 2022-04-04 PROCEDURE — 85027 COMPLETE CBC AUTOMATED: CPT

## 2022-04-04 PROCEDURE — G0463: CPT

## 2022-04-04 PROCEDURE — 36415 COLL VENOUS BLD VENIPUNCTURE: CPT

## 2022-04-04 PROCEDURE — 73700 CT LOWER EXTREMITY W/O DYE: CPT | Mod: 26,RT,ME

## 2022-04-04 PROCEDURE — 86900 BLOOD TYPING SEROLOGIC ABO: CPT

## 2022-04-04 PROCEDURE — 73700 CT LOWER EXTREMITY W/O DYE: CPT | Mod: ME

## 2022-04-04 PROCEDURE — 80048 BASIC METABOLIC PNL TOTAL CA: CPT

## 2022-04-04 RX ORDER — PREGABALIN 225 MG/1
1 CAPSULE ORAL
Qty: 0 | Refills: 0 | DISCHARGE

## 2022-04-04 RX ORDER — CHLORHEXIDINE GLUCONATE 213 G/1000ML
1 SOLUTION TOPICAL ONCE
Refills: 0 | Status: COMPLETED | OUTPATIENT
Start: 2022-04-25 | End: 2022-04-25

## 2022-04-04 RX ORDER — CEFAZOLIN SODIUM 1 G
2000 VIAL (EA) INJECTION ONCE
Refills: 0 | Status: DISCONTINUED | OUTPATIENT
Start: 2022-04-25 | End: 2022-04-26

## 2022-04-04 RX ORDER — TRAMADOL HYDROCHLORIDE 50 MG/1
50 TABLET ORAL ONCE
Refills: 0 | Status: DISCONTINUED | OUTPATIENT
Start: 2022-04-25 | End: 2022-04-25

## 2022-04-04 RX ORDER — INSULIN LISPRO 100/ML
10 VIAL (ML) SUBCUTANEOUS
Qty: 0 | Refills: 0 | DISCHARGE

## 2022-04-04 RX ORDER — SODIUM CHLORIDE 9 MG/ML
3 INJECTION INTRAMUSCULAR; INTRAVENOUS; SUBCUTANEOUS EVERY 8 HOURS
Refills: 0 | Status: DISCONTINUED | OUTPATIENT
Start: 2022-04-25 | End: 2022-04-25

## 2022-04-04 RX ORDER — SENNA PLUS 8.6 MG/1
1 TABLET ORAL
Qty: 0 | Refills: 0 | DISCHARGE

## 2022-04-04 RX ORDER — PANTOPRAZOLE SODIUM 20 MG/1
40 TABLET, DELAYED RELEASE ORAL ONCE
Refills: 0 | Status: COMPLETED | OUTPATIENT
Start: 2022-04-25 | End: 2022-04-25

## 2022-04-04 RX ORDER — LIDOCAINE HCL 20 MG/ML
0.2 VIAL (ML) INJECTION ONCE
Refills: 0 | Status: DISCONTINUED | OUTPATIENT
Start: 2022-04-25 | End: 2022-04-25

## 2022-04-04 NOTE — H&P PST ADULT - HISTORY OF PRESENT ILLNESS
70 y/o male with PMHx of HTN, Obesity, LENARD (CPAP), T2DM, CAD (s/p CABG x4, 11/2015), with right knee pain x 2 years worsening/ OA right knee planned for right total knee arthroplasty with ASHLEIGH robot on 4/25/2022       ***Covid test 4/22 @ [facility]   Denies any recent covid infection or exposure

## 2022-04-04 NOTE — H&P PST ADULT - IS PATIENT PREGNANT?
Past Medical History


Past Medical History:  No Pertinent History


 (LALI SO APRN)


Past Surgical History:  No Surgical History


 (LALI SO APRN)


Smoking Status:  Never Smoker


Alcohol Use:  None


Drug Use:  None


 (JUNIELALI MURRAY)





General Adult


EDM:


Chief Complaint:  EYE PROBLEMS





HPI:


HPI:





Patient is a 12  year old female who presents to the ED today with left upper 

eye swelling, irritation, symptoms began a week ago.  Patient denies any 

drainage.


 (POOLLALI VICTORIA APRN)





Review of Systems:


Review of Systems:


Constitutional:   Denies fever or chills. []


Eyes: Reports left upper eye irritation, swelling.  Denies change in visual 

acuity. []


Musculoskeletal:   Denies back pain or joint pain. [] 


Integument:   Denies rash. [] 


Neurologic:   Denies headache, focal weakness or sensory changes. [] 


Psychiatric:  Denies depression or anxiety. []


 (LALI SO APRN)





Heart Score:


C/O Chest Pain:  N/A


Risk Factors:


Risk Factors:  DM, Current or recent (<one month) smoker, HTN, HLP, family 

history of CAD, obesity.


Risk Scores:


Score 0 - 3:  2.5% MACE over next 6 weeks - Discharge Home


Score 4 - 6:  20.3% MACE over next 6 weeks - Admit for Clinical Observation


Score 7 - 10:  72.7% MACE over next 6 weeks - Early Invasive Strategies


 (LALI SO APRN)





Allergies:


Allergies:





Allergies








Coded Allergies Type Severity Reaction Last Updated Verified


 


  No Known Drug Allergies    3/13/15 No








 (LALI SO APRJED)





Physical Exam:


PE:





Constitutional: Well developed, well nourished, no acute distress, non-toxic 

appearance. []


Eyes: Left upper eyelid with small amount of swelling, PERRLA, EOMI, conjunctiva

 normal, no discharge. [] 


Skin: Warm, dry, no erythema, no rash. [] 


Back: No tenderness, no CVA tenderness. [] 


Extremities: No tenderness, no cyanosis, no clubbing, ROM intact, no edema. [] 


Neurologic: Alert and oriented X 3, normal motor function, normal sensory 

function, no focal deficits noted. []


Psychologic: Affect normal, judgement normal, mood normal. []


 (LALI SO APRN)





Current Patient Data:


Vital Signs:





                                   Vital Signs








  Date Time  Temp Pulse Resp B/P (MAP) Pulse Ox O2 Delivery O2 Flow Rate FiO2


 


7/5/21 16:26 99.2 83 18 120/60 97   





 99.2       








 (LALI SO APRN)





EKG:


EKG:


[]


 (LALI SO)





Radiology/Procedures:


Radiology/Procedures:


[]


 (LALI SO)





Course & Med Decision Making:


Course & Med Decision Making


Pertinent Labs and Imaging studies reviewed. (See chart for details)





This is a 12-year-old female patient presenting to the ED today with left upper 

eyelid swelling, irritation, symptoms began a week ago.  Symptoms are likely 

from seasonal allergies.  Discharged with Zaditor and cetirizine.





 (LALI SO APRN)


Course & Med Decision Making


I oversaw on the above date of service of this patient. This patient was 

evaluated, examined, treated, and dispositioned from the emergency department by

 the mid-level practitioner.  Although I was working at the time and available 

for consultation, no assistance was requested and I did not see or immediately 

direct the care of this patient. I reviewed note and agree to findings, plan of 

care, and disposition as stated. 





Electronically signed, Solo Fishman DO


 (SOLO FISHMAN DO)


sIis Disclaimer:


Dragon Disclaimer:


This electronic medical record was generated, in whole or in part, using a voice

 recognition dictation system.


 (LALI SO APRN)





Departure


Departure


Impression:  


   Primary Impression:  


   Conjunctivitis, allergic, chronic


Disposition:  01 HOME / SELF CARE / HOMELESS


Condition:  STABLE


Referrals:  


CARYL MCKINNEY (PCP)


Follow-up with her doctor in 1 week


Patient Instructions:  Allergic Conjunctivitis, Easy-to-Read





Additional Instructions:  


Chelsea was evaluated in the emergency room, her symptoms are likely seasonal 

allergy related.  Give her Benadryl or Zyrtec.  Give her the eyedrops prescribed

 as ordered.  Follow-up with her pediatrician in 1 to 2 weeks


Scripts


Cetirizine Hcl (CETIRIZINE HCL) 10 Mg Tablet


1 TAB PO DAILY, #30 TAB 5 Refills


   Prov: LALI SO         7/5/21 


Ketotifen Fumarate (ZADITOR) 5 Ml Drops


1 DROP EACHEYE BID, #5 ML 1 Refill


   Prov: LALI SO         7/5/21











LALI SO             Jul 5, 2021 17:18


SOLO FISHMAN DO                  Jul 6, 2021 05:58
not applicable (Male)

## 2022-04-04 NOTE — H&P PST ADULT - HEMATOLOGY/LYMPHATICS
Quality 226: Preventive Care And Screening: Tobacco Use: Screening And Cessation Intervention: Patient screened for tobacco and is an ex-smoker Quality 130: Documentation Of Current Medications In The Medical Record: Current Medications Documented Quality 431: Preventive Care And Screening: Unhealthy Alcohol Use - Screening: Patient screened for unhealthy alcohol use using a single question and scores 2 or greater episodes per year and brief intervention did not occur Detail Level: Detailed negative

## 2022-04-04 NOTE — H&P PST ADULT - PROBLEM SELECTOR PLAN 3
A1c send  FS the day of sx  decrease Lantus to 24 units night before the procedure   Synjardy hold x 3 days for sx

## 2022-04-04 NOTE — H&P PST ADULT - FALL HARM RISK - UNIVERSAL INTERVENTIONS
Bed in lowest position, wheels locked, appropriate side rails in place/Call bell, personal items and telephone in reach/Instruct patient to call for assistance before getting out of bed or chair/Non-slip footwear when patient is out of bed/[place] to call system/Physically safe environment - no spills, clutter or unnecessary equipment/Purposeful Proactive Rounding/Room/bathroom lighting operational, light cord in reach

## 2022-04-04 NOTE — H&P PST ADULT - NSICDXPASTMEDICALHX_GEN_ALL_CORE_FT
PAST MEDICAL HISTORY:  CAD (coronary artery disease) s/p CABG  (11/2015)    Carotid artery plaque     Hyperlipidemia     Hypertension     LENARD on CPAP     Primary osteoarthritis of right knee     Type 2 diabetes mellitus with diabetic retinopathy

## 2022-04-04 NOTE — H&P PST ADULT - ASSESSMENT
MAGOI VTE 2.0 SCORE [CLOT updated 2019]    AGE RELATED RISK FACTORS                                                       MOBILITY RELATED FACTORS  [ ] Age 41-60 years                                            (1 Point)                    [ ] Bed rest                                                        (1 Point)  [x ] Age: 61-74 years                                           (2 Points)                  [ ] Plaster cast                                                   (2 Points)  [ ] Age= 75 years                                              (3 Points)                    [ ] Bed bound for more than 72 hours                 (2 Points)    DISEASE RELATED RISK FACTORS                                               GENDER SPECIFIC FACTORS  [ ] Edema in the lower extremities                       (1 Point)              [ ] Pregnancy                                                     (1 Point)  [ ] Varicose veins                                               (1 Point)                     [ ] Post-partum < 6 weeks                                   (1 Point)             [x ] BMI > 25 Kg/m2                                            (1 Point)                     [ ] Hormonal therapy  or oral contraception          (1 Point)                 [ ] Sepsis (in the previous month)                        (1 Point)               [ ] History of pregnancy complications                 (1 point)  [ ] Pneumonia or serious lung disease                                               [ ] Unexplained or recurrent                     (1 Point)           (in the previous month)                               (1 Point)  [ ] Abnormal pulmonary function test                     (1 Point)                 SURGERY RELATED RISK FACTORS  [ ] Acute myocardial infarction                              (1 Point)               [ ]  Section                                             (1 Point)  [ ] Congestive heart failure (in the previous month)  (1 Point)      [ ] Minor surgery                                                  (1 Point)   [ ] Inflammatory bowel disease                             (1 Point)               [ ] Arthroscopic surgery                                        (2 Points)  [ ] Central venous access                                      (2 Points)                [ ] General surgery lasting more than 45 minutes (2 points)  [ ] Malignancy- Present or previous                   (2 Points)                [ x] Elective arthroplasty                                         (5 points)    [ ] Stroke (in the previous month)                          (5 Points)                                                                                                                                                           HEMATOLOGY RELATED FACTORS                                                 TRAUMA RELATED RISK FACTORS  [ ] Prior episodes of VTE                                     (3 Points)                [ ] Fracture of the hip, pelvis, or leg                       (5 Points)  [ ] Positive family history for VTE                         (3 Points)             [ ] Acute spinal cord injury (in the previous month)  (5 Points)  [ ] Prothrombin 14148 A                                     (3 Points)               [ ] Paralysis  (less than 1 month)                             (5 Points)  [ ] Factor V Leiden                                             (3 Points)                  [ ] Multiple Trauma within 1 month                        (5 Points)  [ ] Lupus anticoagulants                                     (3 Points)                                                           [ ] Anticardiolipin antibodies                               (3 Points)                                                       [ ] High homocysteine in the blood                      (3 Points)                                             [ ] Other congenital or acquired thrombophilia      (3 Points)                                                [ ] Heparin induced thrombocytopenia                  (3 Points)                                     Total Score [     8     ]

## 2022-04-04 NOTE — H&P PST ADULT - PROBLEM SELECTOR PLAN 2
continue ASA 81mg @ night  Will obtain EKG/cardiac eval 4/5/2022  echo 2/2021 on file  metoprolol am of sx

## 2022-04-04 NOTE — H&P PST ADULT - LAST STRESS TEST
Airway patent, Nasal mucosa clear. Mouth with normal mucosa. Throat has no vesicles, no oropharyngeal exudates and uvula is midline. Head is atraumatic.
2017 inferior wall ischemia, EF 46%

## 2022-04-04 NOTE — H&P PST ADULT - PROBLEM SELECTOR PLAN 1
right total knee arthroplasty with ASHLEIGH robot   PST labs send  preprocedure surgical scrub instructions discussed

## 2022-04-04 NOTE — H&P PST ADULT - NSICDXPASTSURGICALHX_GEN_ALL_CORE_FT
PAST SURGICAL HISTORY:  H/O colonoscopy with polypectomy 2018    History of tonsillectomy child    History of varicocele     S/P CABG x 4 11/2015    S/P excision of lipoma left cheek, ~10 years ago    S/P laparoscopic cholecystectomy

## 2022-04-05 ENCOUNTER — NON-APPOINTMENT (OUTPATIENT)
Age: 72
End: 2022-04-05

## 2022-04-05 ENCOUNTER — APPOINTMENT (OUTPATIENT)
Dept: CARDIOLOGY | Facility: CLINIC | Age: 72
End: 2022-04-05
Payer: MEDICARE

## 2022-04-05 VITALS
SYSTOLIC BLOOD PRESSURE: 133 MMHG | BODY MASS INDEX: 39.99 KG/M2 | HEIGHT: 65 IN | WEIGHT: 240 LBS | OXYGEN SATURATION: 95 % | HEART RATE: 82 BPM | DIASTOLIC BLOOD PRESSURE: 78 MMHG

## 2022-04-05 DIAGNOSIS — E10.9 TYPE 1 DIABETES MELLITUS W/OUT COMPLICATIONS: ICD-10-CM

## 2022-04-05 DIAGNOSIS — I65.29 OCCLUSION AND STENOSIS OF UNSPECIFIED CAROTID ARTERY: ICD-10-CM

## 2022-04-05 DIAGNOSIS — E78.5 HYPERLIPIDEMIA, UNSPECIFIED: ICD-10-CM

## 2022-04-05 LAB
A1C WITH ESTIMATED AVERAGE GLUCOSE RESULT: 6.8 % — HIGH (ref 4–5.6)
ESTIMATED AVERAGE GLUCOSE: 148 MG/DL — HIGH (ref 68–114)
MRSA PCR RESULT.: SIGNIFICANT CHANGE UP
S AUREUS DNA NOSE QL NAA+PROBE: DETECTED

## 2022-04-05 PROCEDURE — 93000 ELECTROCARDIOGRAM COMPLETE: CPT

## 2022-04-05 PROCEDURE — 99214 OFFICE O/P EST MOD 30 MIN: CPT

## 2022-04-05 RX ORDER — OMEGA-3/DHA/EPA/FISH OIL 300-1000MG
1000 CAPSULE ORAL TWICE DAILY
Refills: 0 | Status: ACTIVE | COMMUNITY

## 2022-04-05 NOTE — HISTORY OF PRESENT ILLNESS
[FreeTextEntry1] : I saw Ilsa Samuel in the office today for a follow up visit, in anticipation of right total knee arthroplasty.,. . He is a 71-year-old white male who has diabetes, hypertension, hyperlipidemia, who I saw initially 11/16/15 with symptoms of unstable angina. The patient was sent for cardiac catheterization which demonstrated an ejection fraction of 50%. There was 80% distal left main, 90% ostial circumflex, and 80% OM1. The patient underwent coronary bypass graft surgery on 11/18/15, with LIMA graft to the LAD, and vein graft to the diagonal, OM one, and Posterior lateral artery. The patient returns for a followup visit.\par \par Blood work performed 6/21 demonstrated a cholesterol of 88, triglycerides 83, HDL 36, and LDL 35.  A1c was 6.4.  This is on a combination of atorvastatin and Zetia.\par \par He was complaining of exertional dyspnea. Underwent an echocardiogram which showed normal ejection fraction without significant valvular heart disease. Stress test showed small to moderate area of partial reversible inferoseptal and inferior wall defect consistent with MI with arianne-infarction ischemia. EF was 46%. On the basis of that test and persistent symptoms he underwent cardiac catheterization. This showed closed native vessels with a patent LIMA graft to the LAD, and patent vein grafts to the D1, OM1, and OM 2. Wedge pressure was 20.RCA was nondominant and non-fixable. . Is enrolled in a cardiac exercise program.  Repeat stress test 3/18 showed no ischemia at workload of 5.2 METS.\par \par Repeat Echo 2/21 demonstrated EF 58%, mild TR. Carotd doppler showed mild plaque.\par \par Patient has been exercising on his own but this has been limited because of back pain and knee pain.  He does have chronic stable exertional shortness of breath but denies any chest pain.\par \par ECG demonstrates sinus rhythm without change.  Presurgical testing demonstrates normal CBC and BMP.  Fasting glucose was 170.\par \par \par \par \par

## 2022-04-05 NOTE — DISCUSSION/SUMMARY
[FreeTextEntry1] : The patient has history of hypertension, hyperlipidemia, and diabetes.  He underwent three-vessel bypass graft surgery in 2015.  Repeat cardiac catheterization in 2017 showed all his bypass grafts were patent.  Stress test in 3/18 showed no ischemia to workload of 5.2 METS.  Recent echocardiogram performed 12/21 demonstrated a normal ejection fraction without significant valvular heart disease.  Carotid Doppler last year showed mild nonobstructing plaque.  Exam and ECG are unremarkable.\par \par Patient's cardiac status is stable and he represents a satisfactory candidate for the planned right total knee replacement surgery.  No special precautions other than routine hemodynamic monitoring should be required.  Discussed the importance of good lifestyle including exercise and weight control.

## 2022-04-06 PROBLEM — G47.33 OBSTRUCTIVE SLEEP APNEA (ADULT) (PEDIATRIC): Chronic | Status: ACTIVE | Noted: 2022-04-04

## 2022-04-06 PROBLEM — M17.11 UNILATERAL PRIMARY OSTEOARTHRITIS, RIGHT KNEE: Chronic | Status: ACTIVE | Noted: 2022-04-04

## 2022-04-06 PROBLEM — E11.319 TYPE 2 DIABETES MELLITUS WITH UNSPECIFIED DIABETIC RETINOPATHY WITHOUT MACULAR EDEMA: Chronic | Status: ACTIVE | Noted: 2022-04-04

## 2022-04-06 RX ORDER — MUPIROCIN 20 MG/G
2 OINTMENT TOPICAL
Qty: 1 | Refills: 0 | Status: ACTIVE | COMMUNITY
Start: 2022-04-06 | End: 1900-01-01

## 2022-04-12 ENCOUNTER — NON-APPOINTMENT (OUTPATIENT)
Age: 72
End: 2022-04-12

## 2022-04-22 ENCOUNTER — OUTPATIENT (OUTPATIENT)
Dept: OUTPATIENT SERVICES | Facility: HOSPITAL | Age: 72
LOS: 1 days | End: 2022-04-22

## 2022-04-22 DIAGNOSIS — Z98.89 OTHER SPECIFIED POSTPROCEDURAL STATES: Chronic | ICD-10-CM

## 2022-04-22 DIAGNOSIS — Z11.52 ENCOUNTER FOR SCREENING FOR COVID-19: ICD-10-CM

## 2022-04-22 DIAGNOSIS — Z95.1 PRESENCE OF AORTOCORONARY BYPASS GRAFT: Chronic | ICD-10-CM

## 2022-04-22 DIAGNOSIS — Z86.79 PERSONAL HISTORY OF OTHER DISEASES OF THE CIRCULATORY SYSTEM: Chronic | ICD-10-CM

## 2022-04-22 DIAGNOSIS — Z98.890 OTHER SPECIFIED POSTPROCEDURAL STATES: Chronic | ICD-10-CM

## 2022-04-22 DIAGNOSIS — Z90.49 ACQUIRED ABSENCE OF OTHER SPECIFIED PARTS OF DIGESTIVE TRACT: Chronic | ICD-10-CM

## 2022-04-22 LAB — SARS-COV-2 RNA SPEC QL NAA+PROBE: SIGNIFICANT CHANGE UP

## 2022-04-24 ENCOUNTER — FORM ENCOUNTER (OUTPATIENT)
Age: 72
End: 2022-04-24

## 2022-04-25 ENCOUNTER — APPOINTMENT (OUTPATIENT)
Dept: ORTHOPEDIC SURGERY | Facility: HOSPITAL | Age: 72
End: 2022-04-25

## 2022-04-25 ENCOUNTER — OUTPATIENT (OUTPATIENT)
Dept: INPATIENT UNIT | Facility: HOSPITAL | Age: 72
LOS: 1 days | Discharge: HOME | End: 2022-04-25
Payer: MEDICARE

## 2022-04-25 VITALS
OXYGEN SATURATION: 96 % | RESPIRATION RATE: 14 BRPM | HEART RATE: 73 BPM | WEIGHT: 231.93 LBS | DIASTOLIC BLOOD PRESSURE: 87 MMHG | HEIGHT: 65 IN | TEMPERATURE: 98 F | SYSTOLIC BLOOD PRESSURE: 130 MMHG

## 2022-04-25 DIAGNOSIS — Z98.89 OTHER SPECIFIED POSTPROCEDURAL STATES: Chronic | ICD-10-CM

## 2022-04-25 DIAGNOSIS — Z95.1 PRESENCE OF AORTOCORONARY BYPASS GRAFT: Chronic | ICD-10-CM

## 2022-04-25 DIAGNOSIS — Z86.79 PERSONAL HISTORY OF OTHER DISEASES OF THE CIRCULATORY SYSTEM: Chronic | ICD-10-CM

## 2022-04-25 DIAGNOSIS — Z90.49 ACQUIRED ABSENCE OF OTHER SPECIFIED PARTS OF DIGESTIVE TRACT: Chronic | ICD-10-CM

## 2022-04-25 DIAGNOSIS — M17.11 UNILATERAL PRIMARY OSTEOARTHRITIS, RIGHT KNEE: ICD-10-CM

## 2022-04-25 DIAGNOSIS — Z98.890 OTHER SPECIFIED POSTPROCEDURAL STATES: Chronic | ICD-10-CM

## 2022-04-25 DEVICE — MAKO BONE PIN 4MM X 110MM: Type: IMPLANTABLE DEVICE | Site: RIGHT | Status: FUNCTIONAL

## 2022-04-25 DEVICE — MAKO BONE PIN 4MM X 140MM: Type: IMPLANTABLE DEVICE | Site: RIGHT | Status: FUNCTIONAL

## 2022-04-25 DEVICE — BASEPLATE TIB TRIATHLON TRITAN SZ 6: Type: IMPLANTABLE DEVICE | Site: RIGHT | Status: FUNCTIONAL

## 2022-04-25 DEVICE — INSERT TIB BEARING CS X3 SZ 6 10MM: Type: IMPLANTABLE DEVICE | Site: RIGHT | Status: FUNCTIONAL

## 2022-04-25 DEVICE — COMP FEM CRUCIATE RETAINING: Type: IMPLANTABLE DEVICE | Site: RIGHT | Status: FUNCTIONAL

## 2022-04-25 DEVICE — IMP PATELLA SYMMETRIC 9X33MM: Type: IMPLANTABLE DEVICE | Site: RIGHT | Status: FUNCTIONAL

## 2022-04-25 RX ORDER — MAGNESIUM HYDROXIDE 400 MG/1
30 TABLET, CHEWABLE ORAL DAILY
Refills: 0 | Status: DISCONTINUED | OUTPATIENT
Start: 2022-04-25 | End: 2022-04-26

## 2022-04-25 RX ORDER — SODIUM CHLORIDE 9 MG/ML
1000 INJECTION, SOLUTION INTRAVENOUS
Refills: 0 | Status: DISCONTINUED | OUTPATIENT
Start: 2022-04-25 | End: 2022-04-26

## 2022-04-25 RX ORDER — LOSARTAN POTASSIUM 100 MG/1
25 TABLET, FILM COATED ORAL DAILY
Refills: 0 | Status: DISCONTINUED | OUTPATIENT
Start: 2022-04-25 | End: 2022-04-26

## 2022-04-25 RX ORDER — SODIUM CHLORIDE 9 MG/ML
500 INJECTION, SOLUTION INTRAVENOUS ONCE
Refills: 0 | Status: DISCONTINUED | OUTPATIENT
Start: 2022-04-25 | End: 2022-04-25

## 2022-04-25 RX ORDER — ACETAMINOPHEN 500 MG
1000 TABLET ORAL ONCE
Refills: 0 | Status: COMPLETED | OUTPATIENT
Start: 2022-04-26 | End: 2022-04-26

## 2022-04-25 RX ORDER — SODIUM CHLORIDE 9 MG/ML
500 INJECTION, SOLUTION INTRAVENOUS ONCE
Refills: 0 | Status: COMPLETED | OUTPATIENT
Start: 2022-04-25 | End: 2022-04-25

## 2022-04-25 RX ORDER — ACETAMINOPHEN 500 MG
1000 TABLET ORAL ONCE
Refills: 0 | Status: COMPLETED | OUTPATIENT
Start: 2022-04-25 | End: 2022-04-25

## 2022-04-25 RX ORDER — INDAPAMIDE 1.25 MG
1.25 TABLET ORAL DAILY
Refills: 0 | Status: DISCONTINUED | OUTPATIENT
Start: 2022-04-25 | End: 2022-04-25

## 2022-04-25 RX ORDER — DEXTROSE 50 % IN WATER 50 %
15 SYRINGE (ML) INTRAVENOUS ONCE
Refills: 0 | Status: DISCONTINUED | OUTPATIENT
Start: 2022-04-25 | End: 2022-04-26

## 2022-04-25 RX ORDER — FOLIC ACID 0.8 MG
1 TABLET ORAL DAILY
Refills: 0 | Status: DISCONTINUED | OUTPATIENT
Start: 2022-04-25 | End: 2022-04-26

## 2022-04-25 RX ORDER — METOPROLOL TARTRATE 50 MG
25 TABLET ORAL
Refills: 0 | Status: DISCONTINUED | OUTPATIENT
Start: 2022-04-25 | End: 2022-04-26

## 2022-04-25 RX ORDER — SODIUM CHLORIDE 9 MG/ML
1000 INJECTION INTRAMUSCULAR; INTRAVENOUS; SUBCUTANEOUS
Refills: 0 | Status: DISCONTINUED | OUTPATIENT
Start: 2022-04-25 | End: 2022-04-26

## 2022-04-25 RX ORDER — ATORVASTATIN CALCIUM 80 MG/1
80 TABLET, FILM COATED ORAL AT BEDTIME
Refills: 0 | Status: DISCONTINUED | OUTPATIENT
Start: 2022-04-25 | End: 2022-04-26

## 2022-04-25 RX ORDER — EZETIMIBE 10 MG/1
0 TABLET ORAL
Qty: 0 | Refills: 0 | DISCHARGE

## 2022-04-25 RX ORDER — PANTOPRAZOLE SODIUM 20 MG/1
40 TABLET, DELAYED RELEASE ORAL
Refills: 0 | Status: DISCONTINUED | OUTPATIENT
Start: 2022-04-25 | End: 2022-04-26

## 2022-04-25 RX ORDER — TRAMADOL HYDROCHLORIDE 50 MG/1
50 TABLET ORAL EVERY 6 HOURS
Refills: 0 | Status: DISCONTINUED | OUTPATIENT
Start: 2022-04-25 | End: 2022-04-26

## 2022-04-25 RX ORDER — DULAGLUTIDE 4.5 MG/.5ML
0 INJECTION, SOLUTION SUBCUTANEOUS
Qty: 0 | Refills: 0 | DISCHARGE

## 2022-04-25 RX ORDER — FENTANYL CITRATE 50 UG/ML
50 INJECTION INTRAVENOUS
Refills: 0 | Status: DISCONTINUED | OUTPATIENT
Start: 2022-04-25 | End: 2022-04-25

## 2022-04-25 RX ORDER — ASCORBIC ACID 60 MG
500 TABLET,CHEWABLE ORAL
Refills: 0 | Status: DISCONTINUED | OUTPATIENT
Start: 2022-04-25 | End: 2022-04-26

## 2022-04-25 RX ORDER — CEFAZOLIN SODIUM 1 G
2000 VIAL (EA) INJECTION EVERY 8 HOURS
Refills: 0 | Status: COMPLETED | OUTPATIENT
Start: 2022-04-25 | End: 2022-04-26

## 2022-04-25 RX ORDER — ACETAMINOPHEN 500 MG
975 TABLET ORAL EVERY 8 HOURS
Refills: 0 | Status: DISCONTINUED | OUTPATIENT
Start: 2022-04-26 | End: 2022-04-26

## 2022-04-25 RX ORDER — OMEGA-3 ACID ETHYL ESTERS 1 G
1 CAPSULE ORAL
Qty: 0 | Refills: 0 | DISCHARGE

## 2022-04-25 RX ORDER — INSULIN ASPART 100 [IU]/ML
10 INJECTION, SOLUTION SUBCUTANEOUS
Qty: 0 | Refills: 0 | DISCHARGE

## 2022-04-25 RX ORDER — DEXTROSE 50 % IN WATER 50 %
25 SYRINGE (ML) INTRAVENOUS ONCE
Refills: 0 | Status: DISCONTINUED | OUTPATIENT
Start: 2022-04-25 | End: 2022-04-26

## 2022-04-25 RX ORDER — METOPROLOL TARTRATE 50 MG
25 TABLET ORAL
Refills: 0 | Status: DISCONTINUED | OUTPATIENT
Start: 2022-04-25 | End: 2022-04-25

## 2022-04-25 RX ORDER — ACETAMINOPHEN 500 MG
975 TABLET ORAL EVERY 8 HOURS
Refills: 0 | Status: DISCONTINUED | OUTPATIENT
Start: 2022-04-25 | End: 2022-04-25

## 2022-04-25 RX ORDER — INDAPAMIDE 1.25 MG
1 TABLET ORAL
Qty: 0 | Refills: 0 | DISCHARGE

## 2022-04-25 RX ORDER — ONDANSETRON 8 MG/1
4 TABLET, FILM COATED ORAL EVERY 6 HOURS
Refills: 0 | Status: DISCONTINUED | OUTPATIENT
Start: 2022-04-25 | End: 2022-04-26

## 2022-04-25 RX ORDER — INDAPAMIDE 1.25 MG
1.25 TABLET ORAL EVERY 24 HOURS
Refills: 0 | Status: DISCONTINUED | OUTPATIENT
Start: 2022-04-26 | End: 2022-04-26

## 2022-04-25 RX ORDER — LOSARTAN POTASSIUM 100 MG/1
25 TABLET, FILM COATED ORAL DAILY
Refills: 0 | Status: DISCONTINUED | OUTPATIENT
Start: 2022-04-25 | End: 2022-04-25

## 2022-04-25 RX ORDER — OXYCODONE HYDROCHLORIDE 5 MG/1
10 TABLET ORAL
Refills: 0 | Status: DISCONTINUED | OUTPATIENT
Start: 2022-04-25 | End: 2022-04-26

## 2022-04-25 RX ORDER — LOSARTAN POTASSIUM 100 MG/1
1 TABLET, FILM COATED ORAL
Qty: 0 | Refills: 0 | DISCHARGE

## 2022-04-25 RX ORDER — EMPAGLIFLOZIN, METFORMIN HYDROCHLORIDE 10; 1000 MG/1; MG/1
1 TABLET, EXTENDED RELEASE ORAL
Qty: 0 | Refills: 0 | DISCHARGE

## 2022-04-25 RX ORDER — DEXTROSE 50 % IN WATER 50 %
12.5 SYRINGE (ML) INTRAVENOUS ONCE
Refills: 0 | Status: DISCONTINUED | OUTPATIENT
Start: 2022-04-25 | End: 2022-04-26

## 2022-04-25 RX ORDER — CHOLECALCIFEROL (VITAMIN D3) 125 MCG
1 CAPSULE ORAL
Qty: 0 | Refills: 0 | DISCHARGE

## 2022-04-25 RX ORDER — SENNA PLUS 8.6 MG/1
2 TABLET ORAL AT BEDTIME
Refills: 0 | Status: DISCONTINUED | OUTPATIENT
Start: 2022-04-25 | End: 2022-04-26

## 2022-04-25 RX ORDER — ASPIRIN/CALCIUM CARB/MAGNESIUM 324 MG
81 TABLET ORAL
Refills: 0 | Status: DISCONTINUED | OUTPATIENT
Start: 2022-04-26 | End: 2022-04-26

## 2022-04-25 RX ORDER — HYDROMORPHONE HYDROCHLORIDE 2 MG/ML
0.5 INJECTION INTRAMUSCULAR; INTRAVENOUS; SUBCUTANEOUS ONCE
Refills: 0 | Status: DISCONTINUED | OUTPATIENT
Start: 2022-04-25 | End: 2022-04-26

## 2022-04-25 RX ORDER — SODIUM CHLORIDE 9 MG/ML
500 INJECTION INTRAMUSCULAR; INTRAVENOUS; SUBCUTANEOUS ONCE
Refills: 0 | Status: COMPLETED | OUTPATIENT
Start: 2022-04-26 | End: 2022-04-26

## 2022-04-25 RX ORDER — SODIUM CHLORIDE 9 MG/ML
500 INJECTION INTRAMUSCULAR; INTRAVENOUS; SUBCUTANEOUS ONCE
Refills: 0 | Status: COMPLETED | OUTPATIENT
Start: 2022-04-25 | End: 2022-04-25

## 2022-04-25 RX ORDER — GLUCOSAMINE HCL/CHONDROITIN SU 500-400 MG
0 CAPSULE ORAL
Qty: 0 | Refills: 0 | DISCHARGE

## 2022-04-25 RX ORDER — OXYCODONE HYDROCHLORIDE 5 MG/1
5 TABLET ORAL
Refills: 0 | Status: DISCONTINUED | OUTPATIENT
Start: 2022-04-25 | End: 2022-04-26

## 2022-04-25 RX ORDER — SODIUM CHLORIDE 9 MG/ML
1000 INJECTION, SOLUTION INTRAVENOUS
Refills: 0 | Status: DISCONTINUED | OUTPATIENT
Start: 2022-04-25 | End: 2022-04-25

## 2022-04-25 RX ORDER — POLYETHYLENE GLYCOL 3350 17 G/17G
17 POWDER, FOR SOLUTION ORAL AT BEDTIME
Refills: 0 | Status: DISCONTINUED | OUTPATIENT
Start: 2022-04-25 | End: 2022-04-26

## 2022-04-25 RX ORDER — OMEGA-3 ACID ETHYL ESTERS 1 G
2 CAPSULE ORAL
Refills: 0 | Status: DISCONTINUED | OUTPATIENT
Start: 2022-04-25 | End: 2022-04-26

## 2022-04-25 RX ORDER — EZETIMIBE 10 MG/1
10 TABLET ORAL DAILY
Refills: 0 | Status: DISCONTINUED | OUTPATIENT
Start: 2022-04-25 | End: 2022-04-26

## 2022-04-25 RX ORDER — METOPROLOL TARTRATE 50 MG
1 TABLET ORAL
Qty: 0 | Refills: 0 | DISCHARGE

## 2022-04-25 RX ORDER — FENTANYL CITRATE 50 UG/ML
25 INJECTION INTRAVENOUS
Refills: 0 | Status: DISCONTINUED | OUTPATIENT
Start: 2022-04-25 | End: 2022-04-25

## 2022-04-25 RX ORDER — INSULIN LISPRO 100/ML
VIAL (ML) SUBCUTANEOUS
Refills: 0 | Status: DISCONTINUED | OUTPATIENT
Start: 2022-04-25 | End: 2022-04-26

## 2022-04-25 RX ORDER — INSULIN LISPRO 100/ML
VIAL (ML) SUBCUTANEOUS AT BEDTIME
Refills: 0 | Status: DISCONTINUED | OUTPATIENT
Start: 2022-04-25 | End: 2022-04-26

## 2022-04-25 RX ORDER — GLUCAGON INJECTION, SOLUTION 0.5 MG/.1ML
1 INJECTION, SOLUTION SUBCUTANEOUS ONCE
Refills: 0 | Status: DISCONTINUED | OUTPATIENT
Start: 2022-04-25 | End: 2022-04-26

## 2022-04-25 RX ADMIN — Medication 1000 MILLIGRAM(S): at 21:31

## 2022-04-25 RX ADMIN — TRAMADOL HYDROCHLORIDE 50 MILLIGRAM(S): 50 TABLET ORAL at 10:31

## 2022-04-25 RX ADMIN — Medication 2 GRAM(S): at 18:19

## 2022-04-25 RX ADMIN — POLYETHYLENE GLYCOL 3350 17 GRAM(S): 17 POWDER, FOR SOLUTION ORAL at 21:16

## 2022-04-25 RX ADMIN — CHLORHEXIDINE GLUCONATE 1 APPLICATION(S): 213 SOLUTION TOPICAL at 09:00

## 2022-04-25 RX ADMIN — Medication 25 MILLIGRAM(S): at 17:35

## 2022-04-25 RX ADMIN — SODIUM CHLORIDE 75 MILLILITER(S): 9 INJECTION, SOLUTION INTRAVENOUS at 14:15

## 2022-04-25 RX ADMIN — PANTOPRAZOLE SODIUM 40 MILLIGRAM(S): 20 TABLET, DELAYED RELEASE ORAL at 10:31

## 2022-04-25 RX ADMIN — SODIUM CHLORIDE 1000 MILLILITER(S): 9 INJECTION INTRAMUSCULAR; INTRAVENOUS; SUBCUTANEOUS at 16:35

## 2022-04-25 RX ADMIN — SENNA PLUS 2 TABLET(S): 8.6 TABLET ORAL at 21:17

## 2022-04-25 RX ADMIN — Medication 500 MILLIGRAM(S): at 17:36

## 2022-04-25 RX ADMIN — OXYCODONE HYDROCHLORIDE 10 MILLIGRAM(S): 5 TABLET ORAL at 19:22

## 2022-04-25 RX ADMIN — Medication 100 MILLIGRAM(S): at 18:19

## 2022-04-25 RX ADMIN — ATORVASTATIN CALCIUM 80 MILLIGRAM(S): 80 TABLET, FILM COATED ORAL at 21:17

## 2022-04-25 RX ADMIN — Medication 1 TABLET(S): at 21:17

## 2022-04-25 RX ADMIN — Medication 400 MILLIGRAM(S): at 21:16

## 2022-04-25 RX ADMIN — SODIUM CHLORIDE 100 MILLILITER(S): 9 INJECTION INTRAMUSCULAR; INTRAVENOUS; SUBCUTANEOUS at 21:16

## 2022-04-25 RX ADMIN — OXYCODONE HYDROCHLORIDE 10 MILLIGRAM(S): 5 TABLET ORAL at 18:22

## 2022-04-25 RX ADMIN — SODIUM CHLORIDE 100 MILLILITER(S): 9 INJECTION INTRAMUSCULAR; INTRAVENOUS; SUBCUTANEOUS at 16:37

## 2022-04-25 RX ADMIN — Medication 150 MILLIGRAM(S): at 10:31

## 2022-04-25 RX ADMIN — SODIUM CHLORIDE 500 MILLILITER(S): 9 INJECTION, SOLUTION INTRAVENOUS at 14:14

## 2022-04-25 NOTE — PHYSICAL THERAPY INITIAL EVALUATION ADULT - RANGE OF MOTION EXAMINATION, REHAB EVAL
L knee flexion 0-95 degrees/bilateral upper extremity ROM was WFL (within functional limits)/bilateral lower extremity ROM was WFL (within functional limits)

## 2022-04-25 NOTE — PHYSICAL THERAPY INITIAL EVALUATION ADULT - PERTINENT HX OF CURRENT PROBLEM, REHAB EVAL
71 y.o. M PMH HTN, Obesity, LENARD (CPAP), T2DM, CAD (s/p CABG x4, 11/2015), w/ right knee pain x2 years worsening/ OA right knee. Now s/p Right total knee arthroplasty w/ ASHLEIGH robot on 4/25/2022

## 2022-04-25 NOTE — PHYSICAL THERAPY INITIAL EVALUATION ADULT - PLANNED THERAPY INTERVENTIONS, PT EVAL
1. GOAL: In 3 weeks, pt will navigate 13 steps independently./bed mobility training/gait training/transfer training

## 2022-04-25 NOTE — PATIENT PROFILE ADULT - FALL HARM RISK - UNIVERSAL INTERVENTIONS
Bed in lowest position, wheels locked, appropriate side rails in place/Call bell, personal items and telephone in reach/Instruct patient to call for assistance before getting out of bed or chair/Non-slip footwear when patient is out of bed/[employer] to call system/Physically safe environment - no spills, clutter or unnecessary equipment/Purposeful Proactive Rounding/Room/bathroom lighting operational, light cord in reach

## 2022-04-25 NOTE — PHYSICAL THERAPY INITIAL EVALUATION ADULT - ADDITIONAL COMMENTS
Pt resides in a pvt home w/ spouse, no steps to enter, 13 steps inside, +HR. PTA pt was independent w/ all functional mobility & did not use an AD for ambulation although owns a straight cane.

## 2022-04-25 NOTE — PHYSICAL THERAPY INITIAL EVALUATION ADULT - PRECAUTIONS/LIMITATIONS, REHAB EVAL
Surgical Pathology Report



Patient Name:  MARQUISE MONAE

Accession #:  [id number]

Med. Rec. #:  [id number]                                                        

   /Age/Gender:  1963 (Age: 53) / F

Account:  [id number]                                                          

             Location: [facility] SUR[facility]

Taken:  2017

Received:  2017

Reported:  2017

Physicians:  Vaughn Reid M.D.

  



Specimen(s) Received

 SHAVINGS RIGHT KNEE 





Clinical History

Right knee internal derangement







Final Diagnosis

SOFT TISSUE, RIGHT KNEE, ARTHROSCOPIC SHAVINGS:

SYNOVIUM WITH MILD CHRONIC INFLAMMATION AND FIBROSIS; FIBROCARTILAGE WITH

MYXOHYALINE DEGENERATION.





***Electronically Signed***

Alexander Finkelstein, M.D.





Gross Description

Received in formalin, labeled "right knee shavings" is a 3.5 x 2.5 x 0.3 cm

aggregate of tan-yellow soft tissue fragments. A representative portion is

submitted in one cassette.

/2017 surgical precautions

## 2022-04-25 NOTE — PATIENT PROFILE ADULT - BRAND OF FIRST COVID-19 BOOSTER
Final Anesthesia Post-op Assessment    Patient: Cheri Best  Procedure(s) Performed: LEFT SHOULDER ARTHROSCOPY, BICEPS TENODESIS, BURSECTOMY, DISTAL CLAVICLE RESECTION - LEFTLEFT ROTATOR CUFF REPAIR, SUPRASPINATUS AND SUBSCAPULARIS TENDONS - LEFTLEFT SUBACROMIAL DECOMPRESSION - LEFTLEFT SHOULDER ACROMIOPLASTY - LEFT  Anesthesia type: General    Vitals Value Taken Time   Temp 36.4 °C (97.6 °F) 1/29/2020  2:57 PM   Pulse 88 1/29/2020  3:20 PM   Resp 12 1/29/2020  3:20 PM   SpO2 93 % 1/29/2020  3:20 PM   /72 1/29/2020  3:20 PM       Last 24 I/O:     Intake/Output Summary (Last 24 hours) at 1/29/2020 1532  Last data filed at 1/29/2020 1514  Gross per 24 hour   Intake 900 ml   Output --   Net 900 ml         Patient Location: Phase II  Post-op Vital Signs:stable  Level of Consciousness: participates in exam, awake, oriented, answers questions appropriately, alert, follows commands and return to baseline  Respiratory Status: spontaneous ventilation and unassisted  Cardiovascular blood pressure returned to baseline and stable  Hydration: euvolemic  Pain Management: adequately controlled  Nausea: None  Airway Patency:patent  Post-op Assessment: awake, alert, appropriately conversant, or baseline, no complications, patient tolerated procedure well with no complications, evidence of recall and dentition within defined limits      
[employer]

## 2022-04-26 ENCOUNTER — TRANSCRIPTION ENCOUNTER (OUTPATIENT)
Age: 72
End: 2022-04-26

## 2022-04-26 VITALS
OXYGEN SATURATION: 94 % | DIASTOLIC BLOOD PRESSURE: 63 MMHG | TEMPERATURE: 98 F | HEART RATE: 95 BPM | RESPIRATION RATE: 18 BRPM | SYSTOLIC BLOOD PRESSURE: 105 MMHG

## 2022-04-26 LAB
ANION GAP SERPL CALC-SCNC: 15 MMOL/L — SIGNIFICANT CHANGE UP (ref 5–17)
BUN SERPL-MCNC: 21 MG/DL — SIGNIFICANT CHANGE UP (ref 7–23)
CALCIUM SERPL-MCNC: 8.6 MG/DL — SIGNIFICANT CHANGE UP (ref 8.4–10.5)
CHLORIDE SERPL-SCNC: 98 MMOL/L — SIGNIFICANT CHANGE UP (ref 96–108)
CO2 SERPL-SCNC: 23 MMOL/L — SIGNIFICANT CHANGE UP (ref 22–31)
CREAT SERPL-MCNC: 0.67 MG/DL — SIGNIFICANT CHANGE UP (ref 0.5–1.3)
EGFR: 100 ML/MIN/1.73M2 — SIGNIFICANT CHANGE UP
GLUCOSE SERPL-MCNC: 157 MG/DL — HIGH (ref 70–99)
HCT VFR BLD CALC: 35.4 % — LOW (ref 39–50)
HGB BLD-MCNC: 11.4 G/DL — LOW (ref 13–17)
MCHC RBC-ENTMCNC: 28.4 PG — SIGNIFICANT CHANGE UP (ref 27–34)
MCHC RBC-ENTMCNC: 32.2 GM/DL — SIGNIFICANT CHANGE UP (ref 32–36)
MCV RBC AUTO: 88.1 FL — SIGNIFICANT CHANGE UP (ref 80–100)
NRBC # BLD: 0 /100 WBCS — SIGNIFICANT CHANGE UP (ref 0–0)
PLATELET # BLD AUTO: 239 K/UL — SIGNIFICANT CHANGE UP (ref 150–400)
POTASSIUM SERPL-MCNC: 4.4 MMOL/L — SIGNIFICANT CHANGE UP (ref 3.5–5.3)
POTASSIUM SERPL-SCNC: 4.4 MMOL/L — SIGNIFICANT CHANGE UP (ref 3.5–5.3)
RBC # BLD: 4.02 M/UL — LOW (ref 4.2–5.8)
RBC # FLD: 15.1 % — HIGH (ref 10.3–14.5)
SODIUM SERPL-SCNC: 136 MMOL/L — SIGNIFICANT CHANGE UP (ref 135–145)
WBC # BLD: 8.73 K/UL — SIGNIFICANT CHANGE UP (ref 3.8–10.5)
WBC # FLD AUTO: 8.73 K/UL — SIGNIFICANT CHANGE UP (ref 3.8–10.5)

## 2022-04-26 PROCEDURE — 86850 RBC ANTIBODY SCREEN: CPT

## 2022-04-26 PROCEDURE — 97116 GAIT TRAINING THERAPY: CPT

## 2022-04-26 PROCEDURE — 85027 COMPLETE CBC AUTOMATED: CPT

## 2022-04-26 PROCEDURE — U0005: CPT

## 2022-04-26 PROCEDURE — C1776: CPT

## 2022-04-26 PROCEDURE — 97166 OT EVAL MOD COMPLEX 45 MIN: CPT

## 2022-04-26 PROCEDURE — 27447 TOTAL KNEE ARTHROPLASTY: CPT | Mod: RT

## 2022-04-26 PROCEDURE — S2900: CPT

## 2022-04-26 PROCEDURE — 86901 BLOOD TYPING SEROLOGIC RH(D): CPT

## 2022-04-26 PROCEDURE — 80048 BASIC METABOLIC PNL TOTAL CA: CPT

## 2022-04-26 PROCEDURE — 97530 THERAPEUTIC ACTIVITIES: CPT

## 2022-04-26 PROCEDURE — U0003: CPT

## 2022-04-26 PROCEDURE — C9803: CPT

## 2022-04-26 PROCEDURE — 86900 BLOOD TYPING SEROLOGIC ABO: CPT

## 2022-04-26 PROCEDURE — C1713: CPT

## 2022-04-26 PROCEDURE — 73560 X-RAY EXAM OF KNEE 1 OR 2: CPT

## 2022-04-26 PROCEDURE — 82962 GLUCOSE BLOOD TEST: CPT

## 2022-04-26 PROCEDURE — 97161 PT EVAL LOW COMPLEX 20 MIN: CPT

## 2022-04-26 RX ORDER — SODIUM CHLORIDE 9 MG/ML
500 INJECTION INTRAMUSCULAR; INTRAVENOUS; SUBCUTANEOUS ONCE
Refills: 0 | Status: COMPLETED | OUTPATIENT
Start: 2022-04-26 | End: 2022-04-26

## 2022-04-26 RX ORDER — SENNA PLUS 8.6 MG/1
2 TABLET ORAL
Qty: 0 | Refills: 0 | DISCHARGE
Start: 2022-04-26

## 2022-04-26 RX ORDER — ACETAMINOPHEN 500 MG
3 TABLET ORAL
Qty: 0 | Refills: 0 | DISCHARGE
Start: 2022-04-26

## 2022-04-26 RX ORDER — PANTOPRAZOLE SODIUM 20 MG/1
1 TABLET, DELAYED RELEASE ORAL
Qty: 30 | Refills: 0
Start: 2022-04-26 | End: 2022-05-25

## 2022-04-26 RX ORDER — ASPIRIN/CALCIUM CARB/MAGNESIUM 324 MG
1 TABLET ORAL
Qty: 0 | Refills: 0 | DISCHARGE
Start: 2022-04-26

## 2022-04-26 RX ORDER — OXYCODONE HYDROCHLORIDE 5 MG/1
1 TABLET ORAL
Qty: 42 | Refills: 0
Start: 2022-04-26 | End: 2022-05-02

## 2022-04-26 RX ORDER — POLYETHYLENE GLYCOL 3350 17 G/17G
17 POWDER, FOR SOLUTION ORAL
Qty: 0 | Refills: 0 | DISCHARGE
Start: 2022-04-26

## 2022-04-26 RX ORDER — TRAMADOL HYDROCHLORIDE 50 MG/1
1 TABLET ORAL
Qty: 28 | Refills: 0
Start: 2022-04-26 | End: 2022-05-02

## 2022-04-26 RX ORDER — ATORVASTATIN CALCIUM 80 MG/1
0 TABLET, FILM COATED ORAL
Qty: 0 | Refills: 0 | DISCHARGE

## 2022-04-26 RX ADMIN — Medication 1.25 MILLIGRAM(S): at 09:14

## 2022-04-26 RX ADMIN — Medication 2: at 12:44

## 2022-04-26 RX ADMIN — Medication 1000 MILLIGRAM(S): at 05:33

## 2022-04-26 RX ADMIN — OXYCODONE HYDROCHLORIDE 10 MILLIGRAM(S): 5 TABLET ORAL at 10:04

## 2022-04-26 RX ADMIN — LOSARTAN POTASSIUM 25 MILLIGRAM(S): 100 TABLET, FILM COATED ORAL at 05:19

## 2022-04-26 RX ADMIN — SODIUM CHLORIDE 1000 MILLILITER(S): 9 INJECTION INTRAMUSCULAR; INTRAVENOUS; SUBCUTANEOUS at 08:14

## 2022-04-26 RX ADMIN — Medication 500 MILLIGRAM(S): at 05:18

## 2022-04-26 RX ADMIN — Medication 1 TABLET(S): at 11:26

## 2022-04-26 RX ADMIN — Medication 400 MILLIGRAM(S): at 12:43

## 2022-04-26 RX ADMIN — SODIUM CHLORIDE 250 MILLILITER(S): 9 INJECTION INTRAMUSCULAR; INTRAVENOUS; SUBCUTANEOUS at 13:06

## 2022-04-26 RX ADMIN — Medication 1 MILLIGRAM(S): at 11:26

## 2022-04-26 RX ADMIN — Medication 25 MILLIGRAM(S): at 05:19

## 2022-04-26 RX ADMIN — Medication 1 TABLET(S): at 05:19

## 2022-04-26 RX ADMIN — Medication 500 MILLIGRAM(S): at 17:05

## 2022-04-26 RX ADMIN — Medication 2 GRAM(S): at 05:19

## 2022-04-26 RX ADMIN — Medication 81 MILLIGRAM(S): at 17:05

## 2022-04-26 RX ADMIN — PANTOPRAZOLE SODIUM 40 MILLIGRAM(S): 20 TABLET, DELAYED RELEASE ORAL at 05:20

## 2022-04-26 RX ADMIN — Medication 100 MILLIGRAM(S): at 02:10

## 2022-04-26 RX ADMIN — OXYCODONE HYDROCHLORIDE 10 MILLIGRAM(S): 5 TABLET ORAL at 05:00

## 2022-04-26 RX ADMIN — TRAMADOL HYDROCHLORIDE 50 MILLIGRAM(S): 50 TABLET ORAL at 17:08

## 2022-04-26 RX ADMIN — Medication 1000 MILLIGRAM(S): at 13:43

## 2022-04-26 RX ADMIN — Medication 1 TABLET(S): at 13:08

## 2022-04-26 RX ADMIN — OXYCODONE HYDROCHLORIDE 10 MILLIGRAM(S): 5 TABLET ORAL at 11:04

## 2022-04-26 RX ADMIN — Medication 400 MILLIGRAM(S): at 05:18

## 2022-04-26 RX ADMIN — OXYCODONE HYDROCHLORIDE 10 MILLIGRAM(S): 5 TABLET ORAL at 04:30

## 2022-04-26 RX ADMIN — Medication 2 GRAM(S): at 17:05

## 2022-04-26 RX ADMIN — TRAMADOL HYDROCHLORIDE 50 MILLIGRAM(S): 50 TABLET ORAL at 17:30

## 2022-04-26 RX ADMIN — Medication 81 MILLIGRAM(S): at 05:18

## 2022-04-26 RX ADMIN — Medication 1: at 08:13

## 2022-04-26 RX ADMIN — EZETIMIBE 10 MILLIGRAM(S): 10 TABLET ORAL at 11:26

## 2022-04-26 NOTE — PROVIDER CONTACT NOTE (OTHER) - ASSESSMENT
Pt AOx4, VSS on RA, on CPOX. Pt R knee aquacel noted to have moderate drainage and leaking from the Right lateral side of the dressing.

## 2022-04-26 NOTE — PROGRESS NOTE ADULT - ASSESSMENT
S/P R TKR      Plan    ck labs  OOB/PT/WBAT  Discuss with attending re: dressing chg today  Ecotrin for DVT prophlaxis       Jazz Rosado PA-C   Beeper    [id number]

## 2022-04-26 NOTE — OCCUPATIONAL THERAPY INITIAL EVALUATION ADULT - PERTINENT HX OF CURRENT PROBLEM, REHAB EVAL
70 y/o male with PMHx of HTN, Obesity, LENARD (CPAP), T2DM, CAD (s/p CABG x4, 11/2015), with right knee pain x 2 years worsening/ OA right knee. Pt s/p right total knee arthroplasty with ASHLEIGH robot 4/25/2022.

## 2022-04-26 NOTE — PROVIDER CONTACT NOTE (OTHER) - ACTION/TREATMENT ORDERED:
As per MD Marla, reinforce dressing and team will evaluate during AM rounds. R knee aquacel reinforced w/ abd pad and teg. No further interventions ordered at this time. Will continue to monitor.

## 2022-04-26 NOTE — DISCHARGE NOTE NURSING/CASE MANAGEMENT/SOCIAL WORK - PATIENT PORTAL LINK FT
You can access the FollowMyHealth Patient Portal offered by [facility] by registering at the following website: [email or web address]. By joining [facility]’s FollowMyHealth portal, you will also be able to view your health information using other applications (apps) compatible with our system.

## 2022-04-26 NOTE — DISCHARGE NOTE PROVIDER - CARE PROVIDERS DIRECT ADDRESSES
[email or web address]

## 2022-04-26 NOTE — DISCHARGE NOTE PROVIDER - HOSPITAL COURSE
Reason for Admission:  Reason for Admission  right knee pain   Goal: I want to walk without pain     History of Present Illness:  History of Present Illness    70 y/o male with PMHx of HTN, Obesity, LENARD (CPAP), T2DM, CAD (s/p CABG x4, 11/2015), with right knee pain x 2 years worsening/ OA right knee planned for right total knee arthroplasty with ASHLEIGH robot on 4/25/2022      Past Medical, Past Surgical History:  PAST MEDICAL HISTORY:  CAD (coronary artery disease) s/p CABG  (11/2015)  Carotid artery plaque   Hyperlipidemia   Hypertension   LENARD on CPAP   Primary osteoarthritis of right knee   Type 2 diabetes mellitus with diabetic retinopathy.     PAST SURGICAL HISTORY:  H/O colonoscopy with polypectomy 2018  History of tonsillectomy child  History of varicocele   S/P CABG x 4 11/2015  S/P excision of lipoma left cheek, ~10 years ago  S/P laparoscopic cholecystectomy.    HOSPITAL COURSE:  70 y/o M underwent Right total knee arthroplasty (ASHLEIGH) on 4/25/2022 with Dr. Grimes.  Patient tolerated procedure well.  Patient was evaluated postoperatively by physical and occupational therapists and cleared patient for discharge home.  Patient advised to keep surgical incision/dressing clean and dry, and follow up with Dr. Grimes in his office post operative day #14 (5/9/2022).

## 2022-04-26 NOTE — DISCHARGE NOTE PROVIDER - NSDCMRMEDTOKEN_GEN_ALL_CORE_FT
Alpha Lipoic Acid 600 mg oral capsule: orally 2 times a day  aspirin 81 mg oral delayed release tablet: 1 tab(s) orally once a day (at bedtime)  atorvastatin 80 mg oral tablet: 1 tab(s) orally once a day (at bedtime)  atorvastatin 80 mg oral tablet: orally once a day (at bedtime)  chondroitin-glucosamine: orally 2 times a day  1200mg/1500mg  ezetimibe: orally once a day  Fish Oil 1000 mg oral capsule: 1 cap(s) orally 2 times a day  indapamide 1.25 mg oral tablet: 1 tab(s) orally once a day (in the morning)  Lantus 100 units/mL subcutaneous solution: 30 unit(s) subcutaneous once a day (at bedtime)  losartan 25 mg oral tablet: 1 tab(s) orally once a day  metoprolol succinate 25 mg oral tablet, extended release: 1 tab(s) orally 2 times a day  Multiple Vitamins oral tablet: 1 tab(s) orally once a day  NovoLOG 100 units/mL injectable solution: 10 unit(s) subcutaneous 3 times a day (with meals)  Synjardy XR 12.5 mg-1000 mg oral tablet, extended release: 1 tab(s) orally 2 times a day  Trulicity Pen 1.5 mg/0.5 mL subcutaneous solution: subcutaneous once a week Sundays  Vitamin D3 5000 intl units oral tablet: 1 tab(s) orally once a day   Alpha Lipoic Acid 600 mg oral capsule: orally 2 times a day  aspirin 81 mg oral delayed release tablet: 1 tab(s) orally once a day (at bedtime)  atorvastatin 80 mg oral tablet: 1 tab(s) orally once a day (at bedtime)  atorvastatin 80 mg oral tablet: orally once a day (at bedtime)  chondroitin-glucosamine: orally 2 times a day  1200mg/1500mg  ezetimibe: orally once a day  Fish Oil 1000 mg oral capsule: 1 cap(s) orally 2 times a day  indapamide 1.25 mg oral tablet: 1 tab(s) orally once a day (in the morning)  Lantus 100 units/mL subcutaneous solution: 30 unit(s) subcutaneous once a day (at bedtime)  losartan 25 mg oral tablet: 1 tab(s) orally once a day  metoprolol succinate 25 mg oral tablet, extended release: 1 tab(s) orally 2 times a day  Multiple Vitamins oral tablet: 1 tab(s) orally once a day  NovoLOG 100 units/mL injectable solution: 10 unit(s) subcutaneous 3 times a day (with meals)  Rolling Walker: Dx: Right total knee replacement        BEE: 99 months  Synjardy XR 12.5 mg-1000 mg oral tablet, extended release: 1 tab(s) orally 2 times a day  Trulicity Pen 1.5 mg/0.5 mL subcutaneous solution: subcutaneous once a week Sundays  Vitamin D3 5000 intl units oral tablet: 1 tab(s) orally once a day   acetaminophen 325 mg oral tablet: 3 tab(s) orally every 8 hours as needed for fever, H/A, mild pain  Alpha Lipoic Acid 600 mg oral capsule: orally 2 times a day  aspirin 81 mg oral delayed release tablet: 1 tab(s) orally 2 times a day for  1 month post op, then resume 81mg once daily  atorvastatin 80 mg oral tablet: 1 tab(s) orally once a day (at bedtime)  chondroitin-glucosamine: orally 2 times a day  1200mg/1500mg  ezetimibe: orally once a day  Fish Oil 1000 mg oral capsule: 1 cap(s) orally 2 times a day  indapamide 1.25 mg oral tablet: 1 tab(s) orally once a day (in the morning)  Lantus 100 units/mL subcutaneous solution: 30 unit(s) subcutaneous once a day (at bedtime)  losartan 25 mg oral tablet: 1 tab(s) orally once a day  metoprolol succinate 25 mg oral tablet, extended release: 1 tab(s) orally 2 times a day  Multiple Vitamins oral tablet: 1 tab(s) orally once a day  NovoLOG 100 units/mL injectable solution: 10 unit(s) subcutaneous 3 times a day (with meals)  oxyCODONE 5 mg oral tablet: 1 tab(s) orally every 4 hours, As Needed  for severe pain MDD:6  Rolling Walker: Dx: Right total knee replacement        BEE: 99 months  Synjardy XR 12.5 mg-1000 mg oral tablet, extended release: 1 tab(s) orally 2 times a day  traMADol 50 mg oral tablet: 1 tab(s) orally every 6 hours, As needed, moderate pain MDD:4  Trulicity Pen 1.5 mg/0.5 mL subcutaneous solution: subcutaneous once a week Sundays  Vitamin D3 5000 intl units oral tablet: 1 tab(s) orally once a day   acetaminophen 325 mg oral tablet: 3 tab(s) orally every 8 hours as needed for fever, H/A, mild pain  Alpha Lipoic Acid 600 mg oral capsule: orally 2 times a day  aspirin 81 mg oral delayed release tablet: 1 tab(s) orally 2 times a day for  1 month post op, then resume 81mg once daily  atorvastatin 80 mg oral tablet: 1 tab(s) orally once a day (at bedtime)  chondroitin-glucosamine: orally 2 times a day  1200mg/1500mg  ezetimibe: orally once a day  Fish Oil 1000 mg oral capsule: 1 cap(s) orally 2 times a day  indapamide 1.25 mg oral tablet: 1 tab(s) orally once a day (in the morning)  Lantus 100 units/mL subcutaneous solution: 30 unit(s) subcutaneous once a day (at bedtime)  losartan 25 mg oral tablet: 1 tab(s) orally once a day  metoprolol succinate 25 mg oral tablet, extended release: 1 tab(s) orally 2 times a day  Multiple Vitamins oral tablet: 1 tab(s) orally once a day  NovoLOG 100 units/mL injectable solution: 10 unit(s) subcutaneous 3 times a day (with meals)  oxyCODONE 5 mg oral tablet: 1 tab(s) orally every 4 hours, As Needed  for severe pain MDD:6  pantoprazole 40 mg oral delayed release tablet: 1 tab(s) orally once a day (before a meal)  polyethylene glycol 3350 oral powder for reconstitution: 17 gram(s) orally once a day (at bedtime), As Needed  Rolling Walker: Dx: Right total knee replacement        BEE: 99 months  senna oral tablet: 2 tab(s) orally once a day (at bedtime)  Synjardy XR 12.5 mg-1000 mg oral tablet, extended release: 1 tab(s) orally 2 times a day  traMADol 50 mg oral tablet: 1 tab(s) orally every 6 hours, As needed, moderate pain MDD:4  Trulicity Pen 1.5 mg/0.5 mL subcutaneous solution: subcutaneous once a week Sundays  Vitamin D3 5000 intl units oral tablet: 1 tab(s) orally once a day

## 2022-04-26 NOTE — PROGRESS NOTE ADULT - PROBLEM/PLAN-1
DISPLAY PLAN FREE TEXT
I have reviewed and confirmed nurses' notes for patient's medications, allergies, medical history, and surgical history.

## 2022-04-26 NOTE — DISCHARGE NOTE NURSING/CASE MANAGEMENT/SOCIAL WORK - NSDCPEFALRISK_GEN_ALL_CORE
For information on Fall & Injury Prevention, visit: [email or web address] OR  [email or web address] OR  https://www.cdc.gov/steadi/patient.html

## 2022-04-26 NOTE — DISCHARGE NOTE PROVIDER - CARE PROVIDER_API CALL
Abhinav Grimes)  Orthopaedic Surgery  [address], [address]  [place] [address]  Phone: [phone]  Fax: [phone]  Follow Up Time:

## 2022-04-26 NOTE — DISCHARGE NOTE PROVIDER - NSDCFUADDINST_GEN_ALL_CORE_FT
Continue weight bearing as tolerated ambulation with rolling walker. Keep surgical incision/Aquacel dressing clean and dry, follow up with Dr. Grimes in his office post operative day #14 (5/9/2022) for wound check and dressing /suture/staple removal Continue weight bearing as tolerated ambulation with rolling walker. Keep surgical incision/Aquacel dressing clean and dry, remove ace bandage within 48hrs (Thurs 4/28) follow up with Dr. Grimes in his office post operative day #14 (5/9/2022) for wound check and dressing /suture/staple removal

## 2022-04-26 NOTE — PROGRESS NOTE ADULT - SUBJECTIVE AND OBJECTIVE BOX
Patient is a 71y old  Male who presents with a chief complaint of right knee pain   Goal: I want to walk without pain (04 Apr 2022 16:25)      POST OPERATIVE DAY #:  1  Patient comfortable  No complaints    T(C): 37 (04-26-22 @ 04:24), Max: 37 (04-26-22 @ 04:24)  HR: 81 (04-26-22 @ 04:24) (71 - 81)  BP: 114/67 (04-26-22 @ 04:24) (113/60 - 135/67)  RR: 18 (04-26-22 @ 04:24) (14 - 20)  SpO2: 96% (04-26-22 @ 04:24) (95% - 99%)  Wt(kg): --    PHYSICAL EXAM:  NAD, Alert  EXT:  Rt Knee  Aquacel Dressing intact with moderate bloody drainage noted.  Re-enforced dressing applied yesterday  Calves soft  (+) DF/PF;  EHL FHL:  No gross Sensation deficits noted   (+) Distal Pulses;   B/L, PAS

## 2022-05-02 RX ORDER — TRAMADOL HYDROCHLORIDE 50 MG/1
50 TABLET, COATED ORAL
Qty: 40 | Refills: 0 | Status: ACTIVE | COMMUNITY
Start: 2022-05-02 | End: 1900-01-01

## 2022-05-09 ENCOUNTER — APPOINTMENT (OUTPATIENT)
Dept: ORTHOPEDIC SURGERY | Facility: CLINIC | Age: 72
End: 2022-05-09
Payer: MEDICARE

## 2022-05-09 PROCEDURE — 99024 POSTOP FOLLOW-UP VISIT: CPT

## 2022-05-09 PROCEDURE — 73564 X-RAY EXAM KNEE 4 OR MORE: CPT | Mod: 26,RT

## 2022-05-09 RX ORDER — CYCLOBENZAPRINE HYDROCHLORIDE 5 MG/1
5 TABLET, FILM COATED ORAL 3 TIMES DAILY
Qty: 30 | Refills: 0 | Status: ACTIVE | COMMUNITY
Start: 2022-05-09 | End: 1900-01-01

## 2022-05-09 NOTE — DISCUSSION/SUMMARY
[de-identified] : The patient is doing well after joint replacement surgery. Written infectious precautions were reviewed. The patient will progress with physical therapy at this time and they will work on transitioning from requiring assistive devices for ambulation. Anti-coagulant therapy will be discontinued at 1 month post surgery for the purpose of orthopedic thromboembolism prophylaxis. Return around the 6 week anniversary from surgery for follow-up evaluation.

## 2022-05-09 NOTE — PHYSICAL EXAM
[de-identified] : Well developed, well nourished in no apparent distress, awake, alert and orientated to person, place and time with appropriate mood and affect\par Respirations are even and unlabored. Gait evaluation does not reveal a limp. There is no inguinal adenopathy. The affected limb is well-perfused with palpable pedal pulse, without skin lesions, shows a grossly normal motor and sensory examination. Incision is healed. Knee motion is 0-90 [de-identified] : AP, lateral, sunrise knee x-rays of the right knee were ordered and obtained in the office and demonstrate satisfactory position and alignment of the components are present. No signs of loosening are seen.

## 2022-05-09 NOTE — HISTORY OF PRESENT ILLNESS
[de-identified] : Status-post right  total knee  arthroplasty here for initial postoperative evaluation. Excellent progress is noted in terms of pain and restoration of function. Pain is well controlled with oral medications. There has been no change in medical health since discharge. The patient does require assistive devices.

## 2022-06-09 ENCOUNTER — APPOINTMENT (OUTPATIENT)
Dept: ORTHOPEDIC SURGERY | Facility: CLINIC | Age: 72
End: 2022-06-09
Payer: MEDICARE

## 2022-06-09 VITALS — HEIGHT: 65 IN | BODY MASS INDEX: 39.99 KG/M2 | WEIGHT: 240 LBS

## 2022-06-09 PROCEDURE — 99024 POSTOP FOLLOW-UP VISIT: CPT

## 2022-06-09 NOTE — PHYSICAL EXAM
[de-identified] : Well developed, well nourished in no apparent distress, awake, alert and orientated to person, place and time with appropriate mood and affect\par Respirations are even and unlabored. Gait evaluation does not reveal a limp. There is no inguinal adenopathy. The affected limb is well-perfused with palpable pedal pulse, without skin lesions, shows a grossly normal motor and sensory examination. Incision is healed. Knee motion is 0-120

## 2022-06-09 NOTE — DISCUSSION/SUMMARY
[de-identified] : The patient is doing well after joint replacement surgery. WBAT. Return around the 6 month anniversary from surgery for follow-up evaluation.

## 2022-06-09 NOTE — HISTORY OF PRESENT ILLNESS
[de-identified] : Status-post right  total knee  arthroplasty here for routine postoperative evaluation. Excellent progress is noted in terms of pain and restoration of function. Pain is well controlled with oral medications. There has been no change in medical health since discharge. The patient does not require assistive devices.

## 2022-06-23 ENCOUNTER — RX RENEWAL (OUTPATIENT)
Age: 72
End: 2022-06-23

## 2022-08-25 ENCOUNTER — APPOINTMENT (OUTPATIENT)
Dept: ORTHOPEDIC SURGERY | Facility: CLINIC | Age: 72
End: 2022-08-25

## 2022-08-25 VITALS — BODY MASS INDEX: 39.99 KG/M2 | WEIGHT: 240 LBS | HEIGHT: 65 IN

## 2022-08-25 PROCEDURE — 20610 DRAIN/INJ JOINT/BURSA W/O US: CPT | Mod: LT

## 2022-08-25 NOTE — PHYSICAL EXAM
[de-identified] : Well developed, well nourished in no apparent distress, awake, alert and orientated to person, place and time. with appropriate mood and affect. \par Respirations are even and unlabored. Gait evaluation does reveal a limp. There is no inguinal adenopathy. \par The affected limb is well-perfused, without skin lesions, shows a grossly normal motor and sensory examination. \par Right knee motion does cause pain. ROM of the knee is 0-120 degrees.  5 degrees varus\par The knee is stable within that range-of-motion to AP and ML stress. \par Muscle strength is normal. Pedal pulses are palpable

## 2022-08-25 NOTE — HISTORY OF PRESENT ILLNESS
[de-identified] : This is a very nice  72 year old  male experiencing  pain in the left knee which is mild in intensity and has been going on for at least 3 months now. He has right knee OA. The pain somewhat limits activities of daily living. Walking tolerance is slightly reduced. The patient denies any radiation of the pain to the feet and it is not associated with numbness, tingling, or weakness.

## 2022-08-25 NOTE — DISCUSSION/SUMMARY
[de-identified] : The patient has left knee osteoarthritis and a right TKA. They are not an appropriate candidate for surgical intervention at this time. An extensive discussion was conducted on the natural history of the disease and the variety of surgical and non-surgical options available to the patient including, but not limited to non-steroidal anti-inflammatory medications, steroid injections, physical therapy, maintenance of ideal body weight, and reduction of activity. I recommended a prescription of Mobic but the patient would prefer to use OTC NSAIDs at this time. The patient will schedule an appointment as needed. \par \par \par Recommendation: Trial of Visco supplementation. We'll obtain preauthorization prior to injection therapy. \par **NB: Medication was Issued under circumstances where the provider (Dr. Abhinav Grimes) reasonably determined that it would be impractical for the patient to obtain substances prescribed by electronic prescription (e-prescribe) given need for pre-authorization, and such delay would adversely impact the patient's medical condition. An exception letter will be mailed to the [facility] during the authorization process.\par Followup: Once approved for injection therapy. \par \par Injection: left knee joint. \par \par Indication: Osteoarthritis. \par \par A discussion was had with the patient regarding this procedure and all questions were answered. All risks, benefits and alternatives were discussed. These included but were not limited to bleeding, infection, and allergic reaction. Alcohol was used to clean the skin, and betadine was used to sterilize and prep the area in the anterior-medial aspect of the knee. Ethyl chloride spray was then used as a topical anesthetic. A 22-gauge needle was used to inject 3 cc of GEL-ONE into the knee with ease. A sterile bandage was then applied. The patient tolerated the procedure well and there were no complications. \par \par Lot #:  [id number]\par Exp: \par \par The INJECTION  of GEL-ONE was given today under sterile conditions into the knee joint without complication (see procedure note). I again discussed the role of activity modification/icing following the injection to treat any local irritation from the injection. \par \par

## 2022-09-13 NOTE — PRE-ANESTHESIA EVALUATION ADULT - NSANTHPMHFT_GEN_ALL_CORE
Right knee pain for 2 years    4/5/2022 Note from Dr. Paevl Phan (cardiologist) in chart: "Patient's cardiac status is stable and he represents a satisfactory candidate for the planned right total knee replacement surgery"  3/30/2022 Note from Dr. Jose Manuel Wayne (PCP) in chart, deferred to cardiology for clearance
65 y.o. female claims she had Chest CT yest., showed 2 hernias, 1 in epigastric, RLQ.  Pt c/o constant epigastric pain radiated to lower abd since 1pm, n/vx few, last BM ~1pm, no BRBPR, not passing gas after 1pm, belching with some relief, no dysuria, fever, not feeling hungry.  Pt had similar pain in the past.

## 2022-09-27 NOTE — PATIENT PROFILE ADULT - WILL THE PATIENT ACCEPT THE PFIZER COVID-19 VACCINE IF ELIGIBLE AND IT IS AVAILABLE?
Impression: Presence of intraocular lens: Z96.1. Plan: Clear IOLs. Content with current spec rx. Not applicable

## 2022-10-12 ENCOUNTER — NON-APPOINTMENT (OUTPATIENT)
Age: 72
End: 2022-10-12

## 2022-10-12 ENCOUNTER — APPOINTMENT (OUTPATIENT)
Dept: CARDIOLOGY | Facility: CLINIC | Age: 72
End: 2022-10-12

## 2022-10-12 VITALS
SYSTOLIC BLOOD PRESSURE: 129 MMHG | DIASTOLIC BLOOD PRESSURE: 81 MMHG | BODY MASS INDEX: 39.15 KG/M2 | HEART RATE: 74 BPM | OXYGEN SATURATION: 97 % | HEIGHT: 65 IN | WEIGHT: 235 LBS

## 2022-10-12 PROCEDURE — 93000 ELECTROCARDIOGRAM COMPLETE: CPT

## 2022-10-12 PROCEDURE — 99215 OFFICE O/P EST HI 40 MIN: CPT

## 2022-10-12 NOTE — PHYSICAL EXAM
[General Appearance - Well Developed] : well developed [Normal Appearance] : normal appearance [Well Groomed] : well groomed [No Deformities] : no deformities [General Appearance - Well Nourished] : well nourished [General Appearance - In No Acute Distress] : no acute distress [Normal Conjunctiva] : the conjunctiva exhibited no abnormalities [Normal Oral Mucosa] : normal oral mucosa [Normal Jugular Venous A Waves Present] : normal jugular venous A waves present [Normal Jugular Venous V Waves Present] : normal jugular venous V waves present [No Jugular Venous Soto A Waves] : no jugular venous soto A waves [Respiration, Rhythm And Depth] : normal respiratory rhythm and effort [Exaggerated Use Of Accessory Muscles For Inspiration] : no accessory muscle use [Auscultation Breath Sounds / Voice Sounds] : lungs were clear to auscultation bilaterally [Bowel Sounds] : normal bowel sounds [Abdomen Soft] : soft [Abdomen Tenderness] : non-tender [Abnormal Walk] : normal gait [Gait - Sufficient For Exercise Testing] : the gait was sufficient for exercise testing [Nail Clubbing] : no clubbing of the fingernails [Cyanosis, Localized] : no localized cyanosis [Skin Color & Pigmentation] : normal skin color and pigmentation [Skin Turgor] : normal skin turgor [] : no rash [Oriented To Time, Place, And Person] : oriented to person, place, and time [Impaired Insight] : insight and judgment were intact [No Anxiety] : not feeling anxious [Not Palpable] : not palpable [Normal Rate] : normal [Normal S1] : normal S1 [Normal S2] : normal S2 [No Murmur] : no murmurs heard [2+] : left 2+ [1+] : left 1+ [No Abnormalities] : the abdominal aorta was not enlarged and no bruit was heard [No Pitting Edema] : no pitting edema present [S3] : no S3 [S4] : no S4 [Right Carotid Bruit] : no bruit heard over the right carotid [Left Carotid Bruit] : no bruit heard over the left carotid [Right Femoral Bruit] : no bruit heard over the right femoral artery [Left Femoral Bruit] : no bruit heard over the left femoral artery

## 2022-10-12 NOTE — HISTORY OF PRESENT ILLNESS
[FreeTextEntry1] : I saw Ilsa Samuel in the office today for a follow up visit.  He was last seen in the office about 6 months ago, in anticipation of total knee arthroplasty.\par \par He is now 72 years old, with a history of diabetes, hypertension and hyperlipidemia.  He has a history of coronary artery disease, diagnosed in November 2015, in the setting of unstable angina.  At that time, catheterization revealed a left dominant circulation, with an 80% stenosis of the distal left main, a 90% stenosis at the ostium of the circumflex, and 80% stenosis of the first obtuse marginal branch.  He went on to have quadruple vessel bypass surgery, with a LIMA graft to the LAD, and vein grafts to a diagonal, first obtuse marginal, and posterolateral artery.  In 2017 he reported exertional dyspnea.  Catheterization was again performed, which revealed occluded native's, with patent grafts to the LAD, first diagonal, first obtuse marginal and second obtuse marginal.  He was enrolled in cardiac rehabilitation, and did well with that management strategy.   \par \par He presents to the office today with some concerns.  His surgery was performed 6 months ago, without complication.  Unfortunately, while his pain has improved, his ability to ambulate has not.  He has been having difficulty walking and moving around for years, improved minimally since the surgery.  He has not had any significant chest pain, but he does have shortness of breath and he reports that this has grown considerably  worse. He cannot function "as I used to", an issue that has been progressive for months or a year or so.  He specifically denies symptoms suggestive of congestive heart failure or arrhythmia.

## 2022-10-12 NOTE — DISCUSSION/SUMMARY
[FreeTextEntry1] : Ilsa has history of hypertension, hyperlipidemia, and diabetes.  He underwent three-vessel bypass graft surgery in 2015.  Repeat cardiac catheterization in 2017 showed all his bypass grafts were patent.  Stress test in 3/18 showed no ischemia to workload of 5.2 METS.  Recent echocardiogram performed in February 2021 demonstrated a normal ejection fraction without significant valvular heart disease.  Carotid Doppler last year showed mild nonobstructing plaque.  Exam and ECG are unremarkable.\par \par Unfortunately, he has significant, and worsening shortness of breath with activity.  It is not clear to what degree this reflects obesity and deconditioning, and to what degree this might reflect a cardiac issue.  I do think it is worthwhile for us to pursue an echocardiogram and pharmacologic nuclear stress testing.  Regardless of the outcome, he does need to lose a significant amount of weight, and this was emphasized to him and to his wife in detail.  He will schedule the examinations, and I will be in contact with him to discuss the results.\par \par

## 2022-10-17 ENCOUNTER — APPOINTMENT (OUTPATIENT)
Dept: CARDIOLOGY | Facility: CLINIC | Age: 72
End: 2022-10-17

## 2022-10-17 PROCEDURE — 93306 TTE W/DOPPLER COMPLETE: CPT

## 2022-10-18 ENCOUNTER — NON-APPOINTMENT (OUTPATIENT)
Age: 72
End: 2022-10-18

## 2022-10-19 ENCOUNTER — APPOINTMENT (OUTPATIENT)
Dept: CARDIOLOGY | Facility: CLINIC | Age: 72
End: 2022-10-19

## 2022-10-19 PROCEDURE — A9500: CPT

## 2022-10-19 PROCEDURE — 93015 CV STRESS TEST SUPVJ I&R: CPT

## 2022-10-19 PROCEDURE — 78452 HT MUSCLE IMAGE SPECT MULT: CPT

## 2022-10-24 ENCOUNTER — RX RENEWAL (OUTPATIENT)
Age: 72
End: 2022-10-24

## 2022-10-25 ENCOUNTER — APPOINTMENT (OUTPATIENT)
Dept: ORTHOPEDIC SURGERY | Facility: CLINIC | Age: 72
End: 2022-10-25

## 2022-10-25 VITALS
BODY MASS INDEX: 39.15 KG/M2 | WEIGHT: 235 LBS | HEIGHT: 65 IN | SYSTOLIC BLOOD PRESSURE: 106 MMHG | DIASTOLIC BLOOD PRESSURE: 65 MMHG | HEART RATE: 80 BPM

## 2022-10-25 PROCEDURE — 73560 X-RAY EXAM OF KNEE 1 OR 2: CPT | Mod: LT

## 2022-10-25 PROCEDURE — 73564 X-RAY EXAM KNEE 4 OR MORE: CPT | Mod: RT

## 2022-10-25 PROCEDURE — 99214 OFFICE O/P EST MOD 30 MIN: CPT

## 2022-10-25 NOTE — PHYSICAL EXAM
[de-identified] : Patient is well nourished, well-developed, in no acute distress, with appropriate mood and affect. The patient is oriented to time, place, and person. Respirations are even and unlabored. Gait evaluation does reveal a limp. There is no inguinal adenopathy. Bilateral limbs are well-perfused, without skin lesions, shows a grossly normal motor and sensory examination. The right knee motion is painless and the right knee moves from 0 to 125 degrees. The knee is stable within that range-of-motion to AP and ML stress. The alignment of the knee is neutral. Muscle strength is normal. Pedal pulses are palpable. Hip examination was negative. The left knee motion is significantly reduced and does cause significant pain. The left knee moves from 0 to 125 degrees. The knee is stable within that range-of-motion to AP and ML stress. The alignment of the knee is 5 degrees varus. Muscle strength is normal. Pedal pulses are palpable. Hip examination was negative. [de-identified] : AP, lateral, tunnel, and sunrise knee x-rays of the right knee were ordered and obtained in the office and demonstrate satisfactory position and alignment of the components are present. No signs of loosening are seen.  AP radiograph of the left knee was ordered and obtained the office demonstrates left knee osteoarthritis.

## 2022-10-25 NOTE — REASON FOR VISIT
[Follow-Up Visit] : a follow-up visit for [Artificial Knee Joint] : an artificial knee joint [Osteoarthritis, Knee] : osteoarthritis of the knee

## 2022-10-25 NOTE — HISTORY OF PRESENT ILLNESS
[de-identified] : This is a very nice  72 year old  male experiencing  pain in the left knee which is moderate in intensity and has been going on for at least 6 months now.  He is done very well from a right total knee arthroplasty.  Here for 6 months follow-up also from this.  The pain in the left knee somewhat limits activities of daily living. Walking tolerance is slightly reduced.  He did not notice improvement with gel 1.  The patient denies any radiation of the pain to the feet and it is not associated with numbness, tingling, or weakness.

## 2022-10-25 NOTE — DISCUSSION/SUMMARY
[de-identified] : The patient has left knee osteoarthritis and a right TKA which is functioning well. They are an appropriate candidate for surgical intervention at this time but elects to defer surgery. An extensive discussion was conducted on the natural history of the disease and the variety of surgical and non-surgical options available to the patient including, but not limited to non-steroidal anti-inflammatory medications, steroid injections, physical therapy, maintenance of ideal body weight, and reduction of activity. I recommended a prescription of Mobic but the patient would prefer to use OTC NSAIDs at this time.  Weight loss recommended the patient will schedule an appointment as needed. \par

## 2022-11-28 ENCOUNTER — RX RENEWAL (OUTPATIENT)
Age: 72
End: 2022-11-28

## 2022-12-16 NOTE — H&P PST ADULT - GIT ABD PE PAL DETAILS PC
Refill request received for 5 mg lisinopril 1 tablet daily. Patient last seen on 6/22/22 by Dr. Stanford. Normal potassium and creatinine on 6/21/22. Medication listed as current and for patient to continue. Request approved.    tender

## 2023-04-17 ENCOUNTER — NON-APPOINTMENT (OUTPATIENT)
Age: 73
End: 2023-04-17

## 2023-04-17 ENCOUNTER — APPOINTMENT (OUTPATIENT)
Dept: CARDIOLOGY | Facility: CLINIC | Age: 73
End: 2023-04-17
Payer: MEDICARE

## 2023-04-17 VITALS
DIASTOLIC BLOOD PRESSURE: 77 MMHG | HEIGHT: 65 IN | WEIGHT: 240 LBS | HEART RATE: 76 BPM | BODY MASS INDEX: 39.99 KG/M2 | OXYGEN SATURATION: 96 % | SYSTOLIC BLOOD PRESSURE: 126 MMHG

## 2023-04-17 PROCEDURE — 99214 OFFICE O/P EST MOD 30 MIN: CPT

## 2023-04-17 PROCEDURE — 93000 ELECTROCARDIOGRAM COMPLETE: CPT

## 2023-04-17 NOTE — HISTORY OF PRESENT ILLNESS
[FreeTextEntry1] : I saw Ilsa Samuel in the office today for a follow up visit.  He was last seen in the office about 6 months ago.\par \par He is now 72 years old, with a history of diabetes, hypertension and hyperlipidemia.  He has a history of coronary artery disease, diagnosed in November 2015, in the setting of unstable angina.  At that time, catheterization revealed a left dominant circulation, with an 80% stenosis of the distal left main, a 90% stenosis at the ostium of the circumflex, and 80% stenosis of the first obtuse marginal branch.  He went on to have quadruple vessel bypass surgery, with a LIMA graft to the LAD, and vein grafts to a diagonal, first obtuse marginal, and posterolateral artery.  In 2017 he reported exertional dyspnea.  Catheterization was again performed, which revealed occluded native's, with patent grafts to the LAD, first diagonal, first obtuse marginal and second obtuse marginal.  He was enrolled in cardiac rehabilitation, and did well with that management strategy.   \par \par He was seen in the office in October 2022, at which time he reported progressive shortness of breath, and a lack of improvement in his exercise capacity despite his joint replacement surgery.  I could not easily differentiate a cardiac issue from a functional one, and so I recommended echocardiography and pharmacologic nuclear stress testing.  These came out well.\par \par He presents to the office today without any real improvement.  He had started walking for exercise as we had discussed, but he threw his back out a few weeks ago, and is now very limited. His lung doctor said that his dyspnea seems either related to his weight or prior smoking. He has not had any significant chest pain He specifically denies symptoms suggestive of congestive heart failure or arrhythmia.

## 2023-04-17 NOTE — DISCUSSION/SUMMARY
[FreeTextEntry1] : Ilsa has history of hypertension, hyperlipidemia, and diabetes.  He underwent three-vessel bypass graft surgery in 2015.  Repeat cardiac catheterization in 2017 showed all his bypass grafts were patent.  Stress test in October 2022 revealed no evidence of ischemia.  Recent echocardiogram performed in October 2022,  demonstrated a normal ejection fraction without significant valvular heart disease.  Carotid Doppler last year showed mild nonobstructing plaque.  Exam and ECG are unremarkable.\par \par Unfortunately, he has significant, and worsening shortness of breath with activity.  At this point, it seems that this is related to his obesity and deconditioning, and not any structural heart disease, or any significant lung disease.  He has gained 5 pounds instead of losing it, and of course that was a subjective discussion today.  I cannot ask him to exercise more now with his back the issue that it is, but he will resume exercise, and watch his diet, as much as he can.\par \par No additional testing is needed at this time.  He will see me in the office again in about 6 months.

## 2023-07-05 NOTE — DISCHARGE NOTE PROVIDER - NSDCCPTREATMENT_GEN_ALL_CORE_FT
PRINCIPAL PROCEDURE  Procedure: Right total knee replacement  Findings and Treatment:       
[[id number]]

## 2023-09-21 ASSESSMENT — KOOS JR
STRAIGHTENING KNEE FULLY: SEVERE
IMPORTED KOOS JR SCORE: 23.0
GOING UP OR DOWN STAIRS: EXTREME
RISING FROM SITTING: SEVERE
TWISING OR PIVOTING ON KNEE: SEVERE
BENDING TO THE FLOOR TO PICK UP OBJECT: EXTREME
IMPORTED FORM: YES
KOOS JR RAW SCORE: 23
STANDING UPRIGHT: EXTREME
HOW SEVERE IS YOUR KNEE STIFFNESS AFTER FIRST WAKING IN MORNING: MODERATE

## 2023-10-06 ENCOUNTER — RX RENEWAL (OUTPATIENT)
Age: 73
End: 2023-10-06

## 2023-10-17 ENCOUNTER — APPOINTMENT (OUTPATIENT)
Dept: CARDIOLOGY | Facility: CLINIC | Age: 73
End: 2023-10-17
Payer: MEDICARE

## 2023-10-17 ENCOUNTER — NON-APPOINTMENT (OUTPATIENT)
Age: 73
End: 2023-10-17

## 2023-10-17 VITALS — DIASTOLIC BLOOD PRESSURE: 78 MMHG | OXYGEN SATURATION: 97 % | SYSTOLIC BLOOD PRESSURE: 127 MMHG | HEART RATE: 74 BPM

## 2023-10-17 PROCEDURE — 99214 OFFICE O/P EST MOD 30 MIN: CPT

## 2023-10-17 PROCEDURE — 93000 ELECTROCARDIOGRAM COMPLETE: CPT

## 2023-10-17 RX ORDER — DULAGLUTIDE 3 MG/.5ML
3 INJECTION, SOLUTION SUBCUTANEOUS
Qty: 6 | Refills: 0 | Status: DISCONTINUED | COMMUNITY
Start: 2021-07-04 | End: 2023-10-17

## 2023-10-17 RX ORDER — SEMAGLUTIDE 1.34 MG/ML
2 INJECTION, SOLUTION SUBCUTANEOUS
Refills: 0 | Status: ACTIVE | COMMUNITY

## 2023-10-27 ENCOUNTER — APPOINTMENT (OUTPATIENT)
Dept: ORTHOPEDIC SURGERY | Facility: CLINIC | Age: 73
End: 2023-10-27
Payer: MEDICARE

## 2023-10-27 VITALS — HEIGHT: 65 IN | WEIGHT: 239 LBS | BODY MASS INDEX: 39.82 KG/M2

## 2023-10-27 DIAGNOSIS — Z96.651 PRESENCE OF RIGHT ARTIFICIAL KNEE JOINT: ICD-10-CM

## 2023-10-27 PROCEDURE — 73564 X-RAY EXAM KNEE 4 OR MORE: CPT | Mod: 50

## 2023-10-27 PROCEDURE — 99215 OFFICE O/P EST HI 40 MIN: CPT

## 2023-10-30 ENCOUNTER — NON-APPOINTMENT (OUTPATIENT)
Age: 73
End: 2023-10-30

## 2023-11-16 ENCOUNTER — OUTPATIENT (OUTPATIENT)
Dept: OUTPATIENT SERVICES | Facility: HOSPITAL | Age: 73
LOS: 1 days | End: 2023-11-16
Payer: MEDICARE

## 2023-11-16 VITALS
WEIGHT: 237 LBS | SYSTOLIC BLOOD PRESSURE: 109 MMHG | OXYGEN SATURATION: 96 % | RESPIRATION RATE: 17 BRPM | DIASTOLIC BLOOD PRESSURE: 72 MMHG | HEIGHT: 65 IN | HEART RATE: 90 BPM | TEMPERATURE: 98 F

## 2023-11-16 DIAGNOSIS — Z95.1 PRESENCE OF AORTOCORONARY BYPASS GRAFT: Chronic | ICD-10-CM

## 2023-11-16 DIAGNOSIS — Z98.890 OTHER SPECIFIED POSTPROCEDURAL STATES: Chronic | ICD-10-CM

## 2023-11-16 DIAGNOSIS — E11.9 TYPE 2 DIABETES MELLITUS WITHOUT COMPLICATIONS: ICD-10-CM

## 2023-11-16 DIAGNOSIS — M17.12 UNILATERAL PRIMARY OSTEOARTHRITIS, LEFT KNEE: ICD-10-CM

## 2023-11-16 DIAGNOSIS — Z98.89 OTHER SPECIFIED POSTPROCEDURAL STATES: Chronic | ICD-10-CM

## 2023-11-16 DIAGNOSIS — G47.33 OBSTRUCTIVE SLEEP APNEA (ADULT) (PEDIATRIC): ICD-10-CM

## 2023-11-16 DIAGNOSIS — Z01.818 ENCOUNTER FOR OTHER PREPROCEDURAL EXAMINATION: ICD-10-CM

## 2023-11-16 DIAGNOSIS — Z86.79 PERSONAL HISTORY OF OTHER DISEASES OF THE CIRCULATORY SYSTEM: Chronic | ICD-10-CM

## 2023-11-16 DIAGNOSIS — Z90.49 ACQUIRED ABSENCE OF OTHER SPECIFIED PARTS OF DIGESTIVE TRACT: Chronic | ICD-10-CM

## 2023-11-16 PROCEDURE — 83036 HEMOGLOBIN GLYCOSYLATED A1C: CPT

## 2023-11-16 PROCEDURE — 73700 CT LOWER EXTREMITY W/O DYE: CPT | Mod: MG

## 2023-11-16 PROCEDURE — 73700 CT LOWER EXTREMITY W/O DYE: CPT | Mod: 26,LT,MG

## 2023-11-16 PROCEDURE — 85027 COMPLETE CBC AUTOMATED: CPT

## 2023-11-16 PROCEDURE — G1004: CPT

## 2023-11-16 PROCEDURE — G0463: CPT

## 2023-11-16 PROCEDURE — 87640 STAPH A DNA AMP PROBE: CPT

## 2023-11-16 PROCEDURE — 80048 BASIC METABOLIC PNL TOTAL CA: CPT

## 2023-11-16 PROCEDURE — 86850 RBC ANTIBODY SCREEN: CPT

## 2023-11-16 PROCEDURE — 87641 MR-STAPH DNA AMP PROBE: CPT

## 2023-11-16 PROCEDURE — 86901 BLOOD TYPING SEROLOGIC RH(D): CPT

## 2023-11-16 PROCEDURE — 36415 COLL VENOUS BLD VENIPUNCTURE: CPT

## 2023-11-16 PROCEDURE — 86900 BLOOD TYPING SEROLOGIC ABO: CPT

## 2023-11-16 RX ORDER — LIDOCAINE HCL 20 MG/ML
0.2 VIAL (ML) INJECTION ONCE
Refills: 0 | Status: DISCONTINUED | OUTPATIENT
Start: 2023-12-07 | End: 2023-12-07

## 2023-11-16 RX ORDER — PANTOPRAZOLE SODIUM 20 MG/1
40 TABLET, DELAYED RELEASE ORAL ONCE
Refills: 0 | Status: COMPLETED | OUTPATIENT
Start: 2023-12-07 | End: 2023-12-07

## 2023-11-16 RX ORDER — CEFAZOLIN SODIUM 1 G
2000 VIAL (EA) INJECTION ONCE
Refills: 0 | Status: COMPLETED | OUTPATIENT
Start: 2023-12-07 | End: 2023-12-07

## 2023-11-16 RX ORDER — SODIUM CHLORIDE 9 MG/ML
3 INJECTION INTRAMUSCULAR; INTRAVENOUS; SUBCUTANEOUS EVERY 8 HOURS
Refills: 0 | Status: DISCONTINUED | OUTPATIENT
Start: 2023-12-07 | End: 2023-12-07

## 2023-11-16 RX ORDER — CHLORHEXIDINE GLUCONATE 213 G/1000ML
1 SOLUTION TOPICAL ONCE
Refills: 0 | Status: DISCONTINUED | OUTPATIENT
Start: 2023-12-07 | End: 2023-12-07

## 2023-11-16 NOTE — H&P PST ADULT - ASSESSMENT
DASI: walk around house Mets 4  Symptoms : Denies SOB, JACOME, palpitations  Airway : no airway abnormalities , denies prior anesthesia complications   Mallampati : 4  Denies loose teeth     Corneal abrasion risk : Denies     CAPRINI SCORE [CLOT]    AGE RELATED RISK FACTORS                                                       MOBILITY RELATED FACTORS  [ ] Age 41-60 years                                            (1 Point)                  [ ] Bed rest                                                        (1 Point)  [x ] Age: 61-74 years                                           (2 Points)                 [ ] Plaster cast                                                   (2 Points)  [ ] Age= 75 years                                              (3 Points)                 [ ] Bed bound for more than 72 hours                 (2 Points)    DISEASE RELATED RISK FACTORS                                               GENDER SPECIFIC FACTORS  [ ] Edema in the lower extremities                       (1 Point)                  [ ] Pregnancy                                                     (1 Point)  [ ] Varicose veins                                               (1 Point)                  [ ] Post-partum < 6 weeks                                   (1 Point)             [x ] BMI > 25 Kg/m2                                            (1 Point)                  [ ] Hormonal therapy  or oral contraception          (1 Point)                 [ ] Sepsis (in the previous month)                        (1 Point)                  [ ] History of pregnancy complications                 (1 point)  [ ] Pneumonia or serious lung disease                                               [ ] Unexplained or recurrent                     (1 Point)           (in the previous month)                               (1 Point)  [ ] Abnormal pulmonary function test                     (1 Point)                 SURGERY RELATED RISK FACTORS  [ ] Acute myocardial infarction                              (1 Point)                 [ ]  Section                                             (1 Point)  [ ] Congestive heart failure (in the previous month)  (1 Point)               [ ] Minor surgery                                                  (1 Point)   [ ] Inflammatory bowel disease                             (1 Point)                 [ ] Arthroscopic surgery                                        (2 Points)  [ ] Central venous access                                      (2 Points)                [ ] General surgery lasting more than 45 minutes   (2 Points)       [ ] Stroke (in the previous month)                          (5 Points)               [x ] Elective arthroplasty                                         (5 Points)                                                                                                                                               HEMATOLOGY RELATED FACTORS                                                 TRAUMA RELATED RISK FACTORS  [ ] Prior episodes of VTE                                     (3 Points)                [ ] Fracture of the hip, pelvis, or leg                       (5 Points)  [ ] Positive family history for VTE                         (3 Points)                 [ ] Acute spinal cord injury (in the previous month)  (5 Points)  [ ] Prothrombin 32943 A                                     (3 Points)                 [ ] Paralysis  (less than 1 month)                             (5 Points)  [ ] Factor V Leiden                                             (3 Points)                  [ ] Multiple Trauma within 1 month                        (5 Points)  [ ] Lupus anticoagulants                                     (3 Points)                                                           [ ] Anticardiolipin antibodies                               (3 Points)                                                       [ ] High homocysteine in the blood                      (3 Points)                                             [ ] Other congenital or acquired thrombophilia      (3 Points)                                                [ ] Heparin induced thrombocytopenia                  (3 Points)                                          Total Score [     8     ]    Caprini Score 0 - 2:  Low Risk, No VTE Prophylaxis required for most patients, encourage ambulation  Caprini Score 3 - 6:  At Risk, pharmacologic VTE prophylaxis is indicated for most patients (in the absence of a contraindication)  Caprini Score Greater than or = 7:  High Risk, pharmacologic VTE prophylaxis is indicated for most patients (in the absence of a contraindication) DASI: walk around house Mets 4  Symptoms : Denies SOB, JACOME, palpitations  Airway : no airway abnormalities , denies prior anesthesia complications   Mallampati : 4  Denies loose teeth     Corneal abrasion risk : Denies     CAPRINI SCORE [CLOT]    AGE RELATED RISK FACTORS                                                       MOBILITY RELATED FACTORS  [ ] Age 41-60 years                                            (1 Point)                  [ ] Bed rest                                                        (1 Point)  [x ] Age: 61-74 years                                           (2 Points)                 [ ] Plaster cast                                                   (2 Points)  [ ] Age= 75 years                                              (3 Points)                 [ ] Bed bound for more than 72 hours                 (2 Points)    DISEASE RELATED RISK FACTORS                                               GENDER SPECIFIC FACTORS  [ ] Edema in the lower extremities                       (1 Point)                  [ ] Pregnancy                                                     (1 Point)  [ ] Varicose veins                                               (1 Point)                  [ ] Post-partum < 6 weeks                                   (1 Point)             [x ] BMI > 25 Kg/m2                                            (1 Point)                  [ ] Hormonal therapy  or oral contraception          (1 Point)                 [ ] Sepsis (in the previous month)                        (1 Point)                  [ ] History of pregnancy complications                 (1 point)  [ ] Pneumonia or serious lung disease                                               [ ] Unexplained or recurrent                     (1 Point)           (in the previous month)                               (1 Point)  [ ] Abnormal pulmonary function test                     (1 Point)                 SURGERY RELATED RISK FACTORS  [ ] Acute myocardial infarction                              (1 Point)                 [ ]  Section                                             (1 Point)  [ ] Congestive heart failure (in the previous month)  (1 Point)               [ ] Minor surgery                                                  (1 Point)   [ ] Inflammatory bowel disease                             (1 Point)                 [ ] Arthroscopic surgery                                        (2 Points)  [ ] Central venous access                                      (2 Points)                [ ] General surgery lasting more than 45 minutes   (2 Points)       [ ] Stroke (in the previous month)                          (5 Points)               [x ] Elective arthroplasty                                         (5 Points)                                                                                                                                               HEMATOLOGY RELATED FACTORS                                                 TRAUMA RELATED RISK FACTORS  [ ] Prior episodes of VTE                                     (3 Points)                [ ] Fracture of the hip, pelvis, or leg                       (5 Points)  [ ] Positive family history for VTE                         (3 Points)                 [ ] Acute spinal cord injury (in the previous month)  (5 Points)  [ ] Prothrombin 38310 A                                     (3 Points)                 [ ] Paralysis  (less than 1 month)                             (5 Points)  [ ] Factor V Leiden                                             (3 Points)                  [ ] Multiple Trauma within 1 month                        (5 Points)  [ ] Lupus anticoagulants                                     (3 Points)                                                           [ ] Anticardiolipin antibodies                               (3 Points)                                                       [ ] High homocysteine in the blood                      (3 Points)                                             [ ] Other congenital or acquired thrombophilia      (3 Points)                                                [ ] Heparin induced thrombocytopenia                  (3 Points)                                          Total Score [     8     ]    Caprini Score 0 - 2:  Low Risk, No VTE Prophylaxis required for most patients, encourage ambulation  Caprini Score 3 - 6:  At Risk, pharmacologic VTE prophylaxis is indicated for most patients (in the absence of a contraindication)  Caprini Score Greater than or = 7:  High Risk, pharmacologic VTE prophylaxis is indicated for most patients (in the absence of a contraindication) DASI: walk around house Mets 4  Symptoms : Denies SOB, JACOME, palpitations  Airway : no airway abnormalities , denies prior anesthesia complications   Mallampati : 4  Denies loose teeth     Corneal abrasion risk : Denies     CAPRINI SCORE [CLOT]    AGE RELATED RISK FACTORS                                                       MOBILITY RELATED FACTORS  [ ] Age 41-60 years                                            (1 Point)                  [ ] Bed rest                                                        (1 Point)  [x ] Age: 61-74 years                                           (2 Points)                 [ ] Plaster cast                                                   (2 Points)  [ ] Age= 75 years                                              (3 Points)                 [ ] Bed bound for more than 72 hours                 (2 Points)    DISEASE RELATED RISK FACTORS                                               GENDER SPECIFIC FACTORS  [ ] Edema in the lower extremities                       (1 Point)                  [ ] Pregnancy                                                     (1 Point)  [ ] Varicose veins                                               (1 Point)                  [ ] Post-partum < 6 weeks                                   (1 Point)             [x ] BMI > 25 Kg/m2                                            (1 Point)                  [ ] Hormonal therapy  or oral contraception          (1 Point)                 [ ] Sepsis (in the previous month)                        (1 Point)                  [ ] History of pregnancy complications                 (1 point)  [ ] Pneumonia or serious lung disease                                               [ ] Unexplained or recurrent                     (1 Point)           (in the previous month)                               (1 Point)  [ ] Abnormal pulmonary function test                     (1 Point)                 SURGERY RELATED RISK FACTORS  [ ] Acute myocardial infarction                              (1 Point)                 [ ]  Section                                             (1 Point)  [ ] Congestive heart failure (in the previous month)  (1 Point)               [ ] Minor surgery                                                  (1 Point)   [ ] Inflammatory bowel disease                             (1 Point)                 [ ] Arthroscopic surgery                                        (2 Points)  [ ] Central venous access                                      (2 Points)                [ ] General surgery lasting more than 45 minutes   (2 Points)       [ ] Stroke (in the previous month)                          (5 Points)               [x ] Elective arthroplasty                                         (5 Points)                                                                                                                                               HEMATOLOGY RELATED FACTORS                                                 TRAUMA RELATED RISK FACTORS  [ ] Prior episodes of VTE                                     (3 Points)                [ ] Fracture of the hip, pelvis, or leg                       (5 Points)  [ ] Positive family history for VTE                         (3 Points)                 [ ] Acute spinal cord injury (in the previous month)  (5 Points)  [ ] Prothrombin 31639 A                                     (3 Points)                 [ ] Paralysis  (less than 1 month)                             (5 Points)  [ ] Factor V Leiden                                             (3 Points)                  [ ] Multiple Trauma within 1 month                        (5 Points)  [ ] Lupus anticoagulants                                     (3 Points)                                                           [ ] Anticardiolipin antibodies                               (3 Points)                                                       [ ] High homocysteine in the blood                      (3 Points)                                             [ ] Other congenital or acquired thrombophilia      (3 Points)                                                [ ] Heparin induced thrombocytopenia                  (3 Points)                                          Total Score [     8     ]    Caprini Score 0 - 2:  Low Risk, No VTE Prophylaxis required for most patients, encourage ambulation  Caprini Score 3 - 6:  At Risk, pharmacologic VTE prophylaxis is indicated for most patients (in the absence of a contraindication)  Caprini Score Greater than or = 7:  High Risk, pharmacologic VTE prophylaxis is indicated for most patients (in the absence of a contraindication)

## 2023-11-16 NOTE — H&P PST ADULT - PRIMARY CARE PROVIDER
Rosana (PCP)  [phone]   appt 10/23 Bj (endo) [phone] appt 11/30 Rosana (PCP)  [phone]   appt 10/23 Bj (endo) [phone] appt 11/30 Rosana (PCP)  [phone]   appt 10/23 Bj (endo) [phone] appt 11/30

## 2023-11-16 NOTE — H&P PST ADULT - CONTACT INFO FOR SLEEP STUDY
[facility] [phone] [facility] [phone] [facility] [phone]

## 2023-11-16 NOTE — H&P PST ADULT - GASTROINTESTINAL
Home Care Instructions                Procedure:  Epidural Steroid Injection or Joint injection    Activity:    Rest today    Do not work today    Resume normal activity tomorrow    Pain:    You may experience soreness at the injection site for one or two days    You may use an ice pack for 20 minutes every 2 hours for the first 24 hours    You may use a heating pad after the first 24 hours    You may use Tylenol  (acetaminophen) every 4 hours or other pain medicines as directed by your physician    Safety  Sedation medicine, if given may remain active for many hours.    It is important for the next 24 hours that you do not:    Drive a car    Operate machines or power tools    Consume alcohol, including beer    Sign any important papers or legal documents    You may experience numbness radiating into your legs or arms, (depending on the procedure location)  This numbness may last several hours.  Until the numb sensation returns to normal please use caution in walking, climbing stairs, stepping out of your vehicle, etc.    Common side effects of steroids:  Not everyone will experience corticosteroid side effects. If side effects are experienced they will gradually subside in the 7-10 day period following an injection.    Most common side effects include:    Flushed face and/or chest    Feeling of warmth, particularly in face but could be overall feeling of warmth    Increased blood sugar in diabetic patients    Menstrual irregularities may occur.  If taking hormone based birth control an alternate method of birth control is recommended    Sleep disturbances and/or mood swings are possible    Leg cramps    Please contact us if you have:  Severe pain   Fever more than 101.5 degrees Fahrenheit  Signs of infection (redness, swelling or drainage)      If you have questions during normal business hours (8am-5pm Monday-Friday) contact the [facility] at [phone]. If you need help after hours, we recommend that  you go to a hospital emergency room or dial 911.                negative normal/soft/nontender/nondistended/normal active bowel sounds

## 2023-11-16 NOTE — H&P PST ADULT - NSICDXPASTMEDICALHX_GEN_ALL_CORE_FT
PAST MEDICAL HISTORY:  CAD (coronary artery disease) s/p CABG  (11/2015)    Hyperlipidemia     Hypertension     LENARD on CPAP     Primary osteoarthritis of right knee     Type 2 diabetes mellitus with diabetic retinopathy

## 2023-11-16 NOTE — H&P PST ADULT - NSICDXPASTSURGICALHX_GEN_ALL_CORE_FT
PAST SURGICAL HISTORY:  H/O colonoscopy with polypectomy 2018    H/O total knee replacement, right     History of tonsillectomy child    History of varicocele     S/P CABG x 4 11/2015    S/P excision of lipoma left cheek, ~10 years ago    S/P laparoscopic cholecystectomy

## 2023-11-16 NOTE — H&P PST ADULT - OTHER CARE PROVIDERS
[facility]ano (pulm) [phone] appt 8/2023  Brannon (cardiac) [phone] appt 10/23 [facility] (pulm) [phone] appt 8/2023  Brannon (cardiac) [phone] appt 10/23 [facility] (pulm) [phone] appt 8/2023  Brannon (cardiac) [phone] appt 10/23

## 2023-11-16 NOTE — H&P PST ADULT - HISTORY OF PRESENT ILLNESS
flu 2023  shingles 2 years   pneumovax within 10 years  Dtap 2023    *GOAL walk without pain  73 yr old male with hx of CAD (CABG), LENARD ( on CPAP), Diabetes Mellitus type 2 (oral meds and Insulin), HTN, Morbid Obesity, Osteoarthritis of left knee. Noted increase pain left knee difficulty walking work up referred for knee replacement.      *GOAL walk without pain     **LENARD on CPAP to bring device    ***Diabetes Mellitus   Lantus 24units 12/ 6   Synjardy last 12/4  FS on arrival     flu 2023  shingles 2 years   pneumovax within 10 years  Dtap 2023

## 2023-11-21 ENCOUNTER — NON-APPOINTMENT (OUTPATIENT)
Age: 73
End: 2023-11-21

## 2023-11-21 RX ORDER — MUPIROCIN 20 MG/G
2 OINTMENT TOPICAL
Qty: 1 | Refills: 0 | Status: ACTIVE | COMMUNITY
Start: 2023-11-21 | End: 1900-01-01

## 2023-11-22 ENCOUNTER — RX RENEWAL (OUTPATIENT)
Age: 73
End: 2023-11-22

## 2023-12-04 ENCOUNTER — NON-APPOINTMENT (OUTPATIENT)
Age: 73
End: 2023-12-04

## 2023-12-06 ENCOUNTER — TRANSCRIPTION ENCOUNTER (OUTPATIENT)
Age: 73
End: 2023-12-06

## 2023-12-07 ENCOUNTER — APPOINTMENT (OUTPATIENT)
Dept: ORTHOPEDIC SURGERY | Facility: HOSPITAL | Age: 73
End: 2023-12-07

## 2023-12-07 ENCOUNTER — OUTPATIENT (OUTPATIENT)
Dept: INPATIENT UNIT | Facility: HOSPITAL | Age: 73
LOS: 1 days | End: 2023-12-07
Payer: MEDICARE

## 2023-12-07 ENCOUNTER — TRANSCRIPTION ENCOUNTER (OUTPATIENT)
Age: 73
End: 2023-12-07

## 2023-12-07 VITALS
TEMPERATURE: 99 F | RESPIRATION RATE: 18 BRPM | SYSTOLIC BLOOD PRESSURE: 151 MMHG | HEIGHT: 65 IN | HEART RATE: 76 BPM | DIASTOLIC BLOOD PRESSURE: 88 MMHG | OXYGEN SATURATION: 97 % | WEIGHT: 237 LBS

## 2023-12-07 DIAGNOSIS — M17.12 UNILATERAL PRIMARY OSTEOARTHRITIS, LEFT KNEE: ICD-10-CM

## 2023-12-07 DIAGNOSIS — Z98.890 OTHER SPECIFIED POSTPROCEDURAL STATES: Chronic | ICD-10-CM

## 2023-12-07 DIAGNOSIS — Z98.89 OTHER SPECIFIED POSTPROCEDURAL STATES: Chronic | ICD-10-CM

## 2023-12-07 DIAGNOSIS — Z95.1 PRESENCE OF AORTOCORONARY BYPASS GRAFT: Chronic | ICD-10-CM

## 2023-12-07 DIAGNOSIS — Z86.79 PERSONAL HISTORY OF OTHER DISEASES OF THE CIRCULATORY SYSTEM: Chronic | ICD-10-CM

## 2023-12-07 DIAGNOSIS — Z90.49 ACQUIRED ABSENCE OF OTHER SPECIFIED PARTS OF DIGESTIVE TRACT: Chronic | ICD-10-CM

## 2023-12-07 PROCEDURE — 73560 X-RAY EXAM OF KNEE 1 OR 2: CPT | Mod: 26,LT

## 2023-12-07 DEVICE — IMP PATELLA SYMMETRIC 31X9MM: Type: IMPLANTABLE DEVICE | Site: LEFT | Status: FUNCTIONAL

## 2023-12-07 DEVICE — TIBIAL COMPONENT: Type: IMPLANTABLE DEVICE | Site: LEFT | Status: FUNCTIONAL

## 2023-12-07 DEVICE — COMP FEM CR CMNTLSS BEADED W/ PA SZ 5 LT: Type: IMPLANTABLE DEVICE | Site: LEFT | Status: FUNCTIONAL

## 2023-12-07 DEVICE — MAKO BONE PIN 4MM X 140MM: Type: IMPLANTABLE DEVICE | Site: LEFT | Status: FUNCTIONAL

## 2023-12-07 DEVICE — MAKO BONE PIN 4MM X 110MM: Type: IMPLANTABLE DEVICE | Site: LEFT | Status: FUNCTIONAL

## 2023-12-07 DEVICE — INSERT TIB BEARING CS X3 SZ 5 10MM: Type: IMPLANTABLE DEVICE | Site: LEFT | Status: FUNCTIONAL

## 2023-12-07 RX ORDER — SENNA PLUS 8.6 MG/1
1 TABLET ORAL
Refills: 0 | DISCHARGE

## 2023-12-07 RX ORDER — ACETAMINOPHEN 500 MG
1000 TABLET ORAL ONCE
Refills: 0 | Status: COMPLETED | OUTPATIENT
Start: 2023-12-07 | End: 2023-12-07

## 2023-12-07 RX ORDER — GLUCOSAMINE HCL/CHONDROITIN SU 500-400 MG
3 CAPSULE ORAL
Refills: 0 | DISCHARGE

## 2023-12-07 RX ORDER — FOLIC ACID 0.8 MG
1 TABLET ORAL DAILY
Refills: 0 | Status: DISCONTINUED | OUTPATIENT
Start: 2023-12-07 | End: 2023-12-07

## 2023-12-07 RX ORDER — SODIUM CHLORIDE 9 MG/ML
1000 INJECTION, SOLUTION INTRAVENOUS
Refills: 0 | Status: DISCONTINUED | OUTPATIENT
Start: 2023-12-07 | End: 2023-12-08

## 2023-12-07 RX ORDER — INSULIN GLARGINE 100 [IU]/ML
15 INJECTION, SOLUTION SUBCUTANEOUS AT BEDTIME
Refills: 0 | Status: COMPLETED | OUTPATIENT
Start: 2023-12-07 | End: 2023-12-07

## 2023-12-07 RX ORDER — MAGNESIUM HYDROXIDE 400 MG/1
30 TABLET, CHEWABLE ORAL DAILY
Refills: 0 | Status: DISCONTINUED | OUTPATIENT
Start: 2023-12-07 | End: 2023-12-08

## 2023-12-07 RX ORDER — SODIUM CHLORIDE 9 MG/ML
500 INJECTION INTRAMUSCULAR; INTRAVENOUS; SUBCUTANEOUS ONCE
Refills: 0 | Status: COMPLETED | OUTPATIENT
Start: 2023-12-07 | End: 2023-12-07

## 2023-12-07 RX ORDER — INSULIN LISPRO 100/ML
VIAL (ML) SUBCUTANEOUS
Refills: 0 | Status: DISCONTINUED | OUTPATIENT
Start: 2023-12-07 | End: 2023-12-08

## 2023-12-07 RX ORDER — ATORVASTATIN CALCIUM 80 MG/1
10 TABLET, FILM COATED ORAL AT BEDTIME
Refills: 0 | Status: DISCONTINUED | OUTPATIENT
Start: 2023-12-07 | End: 2023-12-08

## 2023-12-07 RX ORDER — DEXAMETHASONE 0.5 MG/5ML
8 ELIXIR ORAL ONCE
Refills: 0 | Status: COMPLETED | OUTPATIENT
Start: 2023-12-08 | End: 2023-12-08

## 2023-12-07 RX ORDER — ONDANSETRON 8 MG/1
4 TABLET, FILM COATED ORAL EVERY 6 HOURS
Refills: 0 | Status: DISCONTINUED | OUTPATIENT
Start: 2023-12-07 | End: 2023-12-08

## 2023-12-07 RX ORDER — ASCORBIC ACID 60 MG
500 TABLET,CHEWABLE ORAL
Refills: 0 | Status: DISCONTINUED | OUTPATIENT
Start: 2023-12-07 | End: 2023-12-08

## 2023-12-07 RX ORDER — DEXTROSE 50 % IN WATER 50 %
25 SYRINGE (ML) INTRAVENOUS ONCE
Refills: 0 | Status: DISCONTINUED | OUTPATIENT
Start: 2023-12-07 | End: 2023-12-08

## 2023-12-07 RX ORDER — LOSARTAN POTASSIUM 100 MG/1
25 TABLET, FILM COATED ORAL DAILY
Refills: 0 | Status: DISCONTINUED | OUTPATIENT
Start: 2023-12-07 | End: 2023-12-08

## 2023-12-07 RX ORDER — ACETAMINOPHEN 500 MG
1000 TABLET ORAL ONCE
Refills: 0 | Status: DISCONTINUED | OUTPATIENT
Start: 2023-12-07 | End: 2023-12-07

## 2023-12-07 RX ORDER — METOPROLOL TARTRATE 50 MG
25 TABLET ORAL DAILY
Refills: 0 | Status: DISCONTINUED | OUTPATIENT
Start: 2023-12-07 | End: 2023-12-07

## 2023-12-07 RX ORDER — SIMVASTATIN 20 MG/1
10 TABLET, FILM COATED ORAL AT BEDTIME
Refills: 0 | Status: DISCONTINUED | OUTPATIENT
Start: 2023-12-07 | End: 2023-12-07

## 2023-12-07 RX ORDER — INDAPAMIDE 1.25 MG
1 TABLET ORAL
Refills: 0 | DISCHARGE

## 2023-12-07 RX ORDER — HYDROMORPHONE HYDROCHLORIDE 2 MG/ML
0.25 INJECTION INTRAMUSCULAR; INTRAVENOUS; SUBCUTANEOUS
Refills: 0 | Status: DISCONTINUED | OUTPATIENT
Start: 2023-12-07 | End: 2023-12-07

## 2023-12-07 RX ORDER — KETOROLAC TROMETHAMINE 30 MG/ML
15 SYRINGE (ML) INJECTION EVERY 8 HOURS
Refills: 0 | Status: DISCONTINUED | OUTPATIENT
Start: 2023-12-07 | End: 2023-12-07

## 2023-12-07 RX ORDER — EZETIMIBE 10 MG/1
1 TABLET ORAL
Refills: 0 | DISCHARGE

## 2023-12-07 RX ORDER — KETOROLAC TROMETHAMINE 30 MG/ML
15 SYRINGE (ML) INJECTION EVERY 6 HOURS
Refills: 0 | Status: DISCONTINUED | OUTPATIENT
Start: 2023-12-07 | End: 2023-12-08

## 2023-12-07 RX ORDER — ASPIRIN/CALCIUM CARB/MAGNESIUM 324 MG
81 TABLET ORAL
Refills: 0 | Status: DISCONTINUED | OUTPATIENT
Start: 2023-12-07 | End: 2023-12-07

## 2023-12-07 RX ORDER — TRAMADOL HYDROCHLORIDE 50 MG/1
50 TABLET ORAL EVERY 6 HOURS
Refills: 0 | Status: DISCONTINUED | OUTPATIENT
Start: 2023-12-07 | End: 2023-12-07

## 2023-12-07 RX ORDER — KETOROLAC TROMETHAMINE 30 MG/ML
15 SYRINGE (ML) INJECTION EVERY 6 HOURS
Refills: 0 | Status: DISCONTINUED | OUTPATIENT
Start: 2023-12-07 | End: 2023-12-07

## 2023-12-07 RX ORDER — OMEGA-3 ACID ETHYL ESTERS 1 G
1 CAPSULE ORAL
Refills: 0 | DISCHARGE

## 2023-12-07 RX ORDER — ACETAMINOPHEN 500 MG
975 TABLET ORAL EVERY 8 HOURS
Refills: 0 | Status: DISCONTINUED | OUTPATIENT
Start: 2023-12-08 | End: 2023-12-08

## 2023-12-07 RX ORDER — EMPAGLIFLOZIN, METFORMIN HYDROCHLORIDE 10; 1000 MG/1; MG/1
1 TABLET, EXTENDED RELEASE ORAL
Refills: 0 | DISCHARGE

## 2023-12-07 RX ORDER — SODIUM CHLORIDE 9 MG/ML
500 INJECTION INTRAMUSCULAR; INTRAVENOUS; SUBCUTANEOUS ONCE
Refills: 0 | Status: COMPLETED | OUTPATIENT
Start: 2023-12-07 | End: 2023-12-08

## 2023-12-07 RX ORDER — INSULIN GLARGINE 100 [IU]/ML
30 INJECTION, SOLUTION SUBCUTANEOUS AT BEDTIME
Refills: 0 | Status: DISCONTINUED | OUTPATIENT
Start: 2023-12-08 | End: 2023-12-08

## 2023-12-07 RX ORDER — OXYCODONE HYDROCHLORIDE 5 MG/1
5 TABLET ORAL EVERY 4 HOURS
Refills: 0 | Status: DISCONTINUED | OUTPATIENT
Start: 2023-12-07 | End: 2023-12-07

## 2023-12-07 RX ORDER — ACETAMINOPHEN 500 MG
1000 TABLET ORAL ONCE
Refills: 0 | Status: COMPLETED | OUTPATIENT
Start: 2023-12-08 | End: 2023-12-08

## 2023-12-07 RX ORDER — GLUCAGON INJECTION, SOLUTION 0.5 MG/.1ML
1 INJECTION, SOLUTION SUBCUTANEOUS ONCE
Refills: 0 | Status: DISCONTINUED | OUTPATIENT
Start: 2023-12-07 | End: 2023-12-08

## 2023-12-07 RX ORDER — SEMAGLUTIDE 0.68 MG/ML
0.5 INJECTION, SOLUTION SUBCUTANEOUS
Refills: 0 | DISCHARGE

## 2023-12-07 RX ORDER — SENNA PLUS 8.6 MG/1
2 TABLET ORAL AT BEDTIME
Refills: 0 | Status: DISCONTINUED | OUTPATIENT
Start: 2023-12-07 | End: 2023-12-08

## 2023-12-07 RX ORDER — INSULIN LISPRO 100/ML
10 VIAL (ML) SUBCUTANEOUS
Refills: 0 | Status: DISCONTINUED | OUTPATIENT
Start: 2023-12-07 | End: 2023-12-08

## 2023-12-07 RX ORDER — METOPROLOL TARTRATE 50 MG
1 TABLET ORAL
Refills: 0 | DISCHARGE

## 2023-12-07 RX ORDER — INSULIN LISPRO 100/ML
VIAL (ML) SUBCUTANEOUS AT BEDTIME
Refills: 0 | Status: DISCONTINUED | OUTPATIENT
Start: 2023-12-07 | End: 2023-12-08

## 2023-12-07 RX ORDER — UBIDECARENONE 100 MG
200 CAPSULE ORAL
Refills: 0 | DISCHARGE

## 2023-12-07 RX ORDER — HYDROMORPHONE HYDROCHLORIDE 2 MG/ML
2 INJECTION INTRAMUSCULAR; INTRAVENOUS; SUBCUTANEOUS EVERY 4 HOURS
Refills: 0 | Status: DISCONTINUED | OUTPATIENT
Start: 2023-12-07 | End: 2023-12-07

## 2023-12-07 RX ORDER — DOCUSATE SODIUM 100 MG
1 CAPSULE ORAL
Refills: 0 | DISCHARGE

## 2023-12-07 RX ORDER — METOPROLOL TARTRATE 50 MG
25 TABLET ORAL
Refills: 0 | Status: DISCONTINUED | OUTPATIENT
Start: 2023-12-07 | End: 2023-12-08

## 2023-12-07 RX ORDER — DEXTROSE 50 % IN WATER 50 %
15 SYRINGE (ML) INTRAVENOUS ONCE
Refills: 0 | Status: DISCONTINUED | OUTPATIENT
Start: 2023-12-07 | End: 2023-12-08

## 2023-12-07 RX ORDER — LOSARTAN POTASSIUM 100 MG/1
25 TABLET, FILM COATED ORAL DAILY
Refills: 0 | Status: DISCONTINUED | OUTPATIENT
Start: 2023-12-07 | End: 2023-12-07

## 2023-12-07 RX ORDER — PANTOPRAZOLE SODIUM 20 MG/1
40 TABLET, DELAYED RELEASE ORAL
Refills: 0 | Status: DISCONTINUED | OUTPATIENT
Start: 2023-12-07 | End: 2023-12-08

## 2023-12-07 RX ORDER — LOSARTAN POTASSIUM 100 MG/1
1 TABLET, FILM COATED ORAL
Refills: 0 | DISCHARGE

## 2023-12-07 RX ORDER — POLYETHYLENE GLYCOL 3350 17 G/17G
17 POWDER, FOR SOLUTION ORAL AT BEDTIME
Refills: 0 | Status: DISCONTINUED | OUTPATIENT
Start: 2023-12-07 | End: 2023-12-08

## 2023-12-07 RX ORDER — CEFAZOLIN SODIUM 1 G
2000 VIAL (EA) INJECTION EVERY 8 HOURS
Refills: 0 | Status: COMPLETED | OUTPATIENT
Start: 2023-12-07 | End: 2023-12-07

## 2023-12-07 RX ORDER — OXYCODONE HYDROCHLORIDE 5 MG/1
10 TABLET ORAL EVERY 4 HOURS
Refills: 0 | Status: DISCONTINUED | OUTPATIENT
Start: 2023-12-07 | End: 2023-12-07

## 2023-12-07 RX ORDER — CELECOXIB 200 MG/1
200 CAPSULE ORAL EVERY 12 HOURS
Refills: 0 | Status: DISCONTINUED | OUTPATIENT
Start: 2023-12-08 | End: 2023-12-08

## 2023-12-07 RX ORDER — DEXTROSE 50 % IN WATER 50 %
12.5 SYRINGE (ML) INTRAVENOUS ONCE
Refills: 0 | Status: DISCONTINUED | OUTPATIENT
Start: 2023-12-07 | End: 2023-12-08

## 2023-12-07 RX ORDER — ASPIRIN/CALCIUM CARB/MAGNESIUM 324 MG
81 TABLET ORAL
Refills: 0 | Status: DISCONTINUED | OUTPATIENT
Start: 2023-12-07 | End: 2023-12-08

## 2023-12-07 RX ADMIN — Medication 15 MILLIGRAM(S): at 22:09

## 2023-12-07 RX ADMIN — Medication 1 MILLIGRAM(S): at 13:45

## 2023-12-07 RX ADMIN — Medication 81 MILLIGRAM(S): at 18:30

## 2023-12-07 RX ADMIN — Medication 1000 MILLIGRAM(S): at 16:45

## 2023-12-07 RX ADMIN — Medication 1: at 11:13

## 2023-12-07 RX ADMIN — Medication 100 MILLIGRAM(S): at 23:25

## 2023-12-07 RX ADMIN — ATORVASTATIN CALCIUM 10 MILLIGRAM(S): 80 TABLET, FILM COATED ORAL at 22:13

## 2023-12-07 RX ADMIN — Medication 400 MILLIGRAM(S): at 23:05

## 2023-12-07 RX ADMIN — Medication 1 TABLET(S): at 11:02

## 2023-12-07 RX ADMIN — Medication 100 MILLIGRAM(S): at 16:03

## 2023-12-07 RX ADMIN — Medication 15 MILLIGRAM(S): at 15:34

## 2023-12-07 RX ADMIN — Medication 500 MILLIGRAM(S): at 17:35

## 2023-12-07 RX ADMIN — Medication 25 MILLIGRAM(S): at 17:36

## 2023-12-07 RX ADMIN — SODIUM CHLORIDE 500 MILLILITER(S): 9 INJECTION INTRAMUSCULAR; INTRAVENOUS; SUBCUTANEOUS at 12:23

## 2023-12-07 RX ADMIN — Medication 1 TABLET(S): at 12:23

## 2023-12-07 RX ADMIN — SODIUM CHLORIDE 500 MILLILITER(S): 9 INJECTION INTRAMUSCULAR; INTRAVENOUS; SUBCUTANEOUS at 09:59

## 2023-12-07 RX ADMIN — Medication 400 MILLIGRAM(S): at 15:34

## 2023-12-07 RX ADMIN — Medication 1000 MILLIGRAM(S): at 11:30

## 2023-12-07 RX ADMIN — INSULIN GLARGINE 15 UNIT(S): 100 INJECTION, SOLUTION SUBCUTANEOUS at 22:19

## 2023-12-07 RX ADMIN — SENNA PLUS 2 TABLET(S): 8.6 TABLET ORAL at 22:13

## 2023-12-07 RX ADMIN — Medication 10 UNIT(S): at 17:33

## 2023-12-07 RX ADMIN — Medication 15 MILLIGRAM(S): at 16:45

## 2023-12-07 RX ADMIN — PANTOPRAZOLE SODIUM 40 MILLIGRAM(S): 20 TABLET, DELAYED RELEASE ORAL at 07:05

## 2023-12-07 NOTE — PRE-ANESTHESIA EVALUATION ADULT - ANESTHESIA, PREVIOUS REACTION, PROFILE
-Tmax in 24 hours 103.5 10/4/17  -Blood cx x 2, chest xray, urine cx all ordered on 10/3/17 ngtd and do not give source of infection; ordered chest xray PA/LAT 10/4/17 (not done yet)  -Cefepime started on 10/3/17; vanc started on 10/4/17  - CXR 10/4/17 shows bilateral pneumonia   - fever curve appears to be improving  - ID consulted; appreciate recommendations   -Will re culture q 24 hours if continues to be febrile   none

## 2023-12-07 NOTE — PHYSICAL THERAPY INITIAL EVALUATION ADULT - PERTINENT HX OF CURRENT PROBLEM, REHAB EVAL
Pt is a 72 y/o M with hx of CAD (CABG), LENARD (on CPAP), Diabetes Mellitus type 2 (oral meds and Insulin), HTN, Morbid Obesity, Osteoarthritis of left knee. Noted increase pain left knee difficulty walking work up referred for knee replacement. Pt is now s/p L TKA with ASHLEIGH on 12/7. Pt is a 74 y/o M with hx of CAD (CABG), LENARD (on CPAP), Diabetes Mellitus type 2 (oral meds and Insulin), HTN, Morbid Obesity, Osteoarthritis of left knee. Noted increase pain left knee difficulty walking work up referred for knee replacement. Pt is now s/p L TKA with ASHLEIGH on 12/7.

## 2023-12-07 NOTE — PRE-ANESTHESIA EVALUATION ADULT - NSANTHPMHFT_GEN_ALL_CORE
73 y/m, NKDA, PMH- CAD (CABG 2015), METS<4 RECENT STRESS/ECHO WNL, LENARD CPap compliant, DM2, denies SOB/CP, presents for left TKA

## 2023-12-07 NOTE — OCCUPATIONAL THERAPY INITIAL EVALUATION ADULT - PERTINENT HX OF CURRENT PROBLEM, REHAB EVAL
Pt is a 72 y/o M with hx of CAD (CABG), LENARD (on CPAP), Diabetes Mellitus type 2 (oral meds and Insulin), HTN, Morbid Obesity, Osteoarthritis of left knee. Noted increase pain left knee difficulty walking work up referred for knee replacement. Pt is now s/p LEFT  TKA with ASHLEIGH on 12/7.

## 2023-12-07 NOTE — PRE-OP CHECKLIST - HEIGHT IN INCHES
Patient walked to bathroom, became hypoxic SpO2 down to 82% on 15L HFNC, but had refused to use urinal.  Increased work of breathing noted.  Placed back on 15L oxymask.  SpO2 inreased to 90% after 10 minutes.  Refused BiPAP.  Dialysis ready and dinner tray came. Placed back on 15L HFNC.  Will encourage BiPAP once done eating dinner.   5

## 2023-12-07 NOTE — PATIENT PROFILE ADULT - FALL HARM RISK - UNIVERSAL INTERVENTIONS
Bed in lowest position, wheels locked, appropriate side rails in place/Call bell, personal items and telephone in reach/Instruct patient to call for assistance before getting out of bed or chair/Non-slip footwear when patient is out of bed/[place] to call system/Physically safe environment - no spills, clutter or unnecessary equipment/Purposeful Proactive Rounding/Room/bathroom lighting operational, light cord in reach Bed in lowest position, wheels locked, appropriate side rails in place/Call bell, personal items and telephone in reach/Instruct patient to call for assistance before getting out of bed or chair/Non-slip footwear when patient is out of bed/[place] to call system/Physically safe environment - no spills, clutter or unnecessary equipment/Purposeful Proactive Rounding/Room/bathroom lighting operational, light cord in reach

## 2023-12-07 NOTE — PHYSICAL THERAPY INITIAL EVALUATION ADULT - ADDITIONAL COMMENTS
Pt lives in private house with his wife, no steps to enter, can stay on first floor of home. Pt independently performs ADLs and ambulates without AD, owns rolling walker

## 2023-12-07 NOTE — PRE-ANESTHESIA EVALUATION ADULT - NSANTHPEFT_GEN_ALL_CORE
GENERAL: NAD, Obese  HEAD:  Atraumatic, Normocephalic  CHEST/LUNG: Clear to auscultation bilaterally  HEART: Normal S1/S2  PSYCH: AAOx3  NEUROLOGY: non-focal  SKIN: No obvious rashes or lesions.

## 2023-12-07 NOTE — CHART NOTE - NSCHARTNOTEFT_GEN_A_CORE
Post-Operative Check    Pt evaluated at bedside resting without complaints. Pt states pain is well tolerated. Pt admits he doesn't like how he feels when he takes oxycodone and was getting tremors when he took Tramadol.  Pt admits he alternates between Tylenol and Alieve to manage his pain. No Chest Pain, SOB, N/V.    Vitals:  T(C): 36.5 (12-07-23 @ 14:15), Max: 37 (12-07-23 @ 05:35)  HR: 81 (12-07-23 @ 14:15) (76 - 86)  BP: 118/72 (12-07-23 @ 14:15) (93/66 - 151/88)  RR: 18 (12-07-23 @ 14:15) (15 - 18)  SpO2: 94% (12-07-23 @ 14:15) (94% - 100%)    Exam:  Alert and Oriented, No Acute Distress. VSS.   Laterality: LLE     Aquacel dressing is clean, dry and intact.      (+) PF/DF/EHL/FHL 5/5     Diminished sensation to light touch      2+ DP/PT pulse  Calves soft, non-tender bilaterally    Xray: < from: Xray Knee 1 or 2 Views, Left (12.07.23 @ 09:12) >  IMPRESSION:     Unconstrained left total knee prosthesis implanted.     Surgical clips in posteromedial knee soft tissues.       Intact aligned hardware and no periprosthetic fractures.     Postoperative soft tissue changes.     Correlate with intraoperative findings.    A/P: 73y Male s/p Left Total Knee Arthroplasty with ASHLEIGH robotic assist. VSS. NAD  -PT/OT: WBAT/OOB  -IS bedside  -Ice/elevation   -DVT PPx: Aspirin 81mg BID, SCD, Early OOB and Amb  -GI PPx: Protonix 40mg   -Pain Control     -No narcotics  -Continue post-op abx x 24hrs  -f/u AM labs.   -Dispo planning: anticipating home    Pavel Patel PA-C  Orthopedic Surgery Team  Team Pager #[phone] Post-Operative Check    Pt evaluated at bedside resting without complaints. Pt states pain is well tolerated. Pt admits he doesn't like how he feels when he takes oxycodone and was getting tremors when he took Tramadol.  Pt admits he alternates between Tylenol and Alieve to manage his pain. No Chest Pain, SOB, N/V.    Vitals:  T(C): 36.5 (12-07-23 @ 14:15), Max: 37 (12-07-23 @ 05:35)  HR: 81 (12-07-23 @ 14:15) (76 - 86)  BP: 118/72 (12-07-23 @ 14:15) (93/66 - 151/88)  RR: 18 (12-07-23 @ 14:15) (15 - 18)  SpO2: 94% (12-07-23 @ 14:15) (94% - 100%)    Exam:  Alert and Oriented, No Acute Distress. VSS.   Laterality: LLE     Aquacel dressing is clean, dry and intact.      (+) PF/DF/EHL/FHL 5/5     Diminished sensation to light touch      2+ DP/PT pulse  Calves soft, non-tender bilaterally    Xray: < from: Xray Knee 1 or 2 Views, Left (12.07.23 @ 09:12) >  IMPRESSION:     Unconstrained left total knee prosthesis implanted.     Surgical clips in posteromedial knee soft tissues.       Intact aligned hardware and no periprosthetic fractures.     Postoperative soft tissue changes.     Correlate with intraoperative findings.    A/P: 73y Male s/p Left Total Knee Arthroplasty with ASHLEIGH robotic assist. VSS. NAD  -PT/OT: WBAT/OOB  -IS bedside  -Ice/elevation   -DVT PPx: Aspirin 81mg BID, SCD, Early OOB and Amb  -GI PPx: Protonix 40mg   -Pain Control     -No narcotics  -Continue post-op abx x 24hrs  -f/u AM labs.   -Dispo planning: anticipating home    Pavel Patel PA-C  Orthopedic Surgery Team  Team Pager #[phone] Post-Operative Check    Pt evaluated at bedside resting without complaints. Pt states pain is well tolerated. Pt admits he doesn't like how he feels when he takes oxycodone and was getting tremors when he took Tramadol.  Pt admits he alternates between Tylenol and Alieve to manage his pain. No Chest Pain, SOB, N/V.    Vitals:  T(C): 36.5 (12-07-23 @ 14:15), Max: 37 (12-07-23 @ 05:35)  HR: 81 (12-07-23 @ 14:15) (76 - 86)  BP: 118/72 (12-07-23 @ 14:15) (93/66 - 151/88)  RR: 18 (12-07-23 @ 14:15) (15 - 18)  SpO2: 94% (12-07-23 @ 14:15) (94% - 100%)    Exam:  Alert and Oriented, No Acute Distress. VSS.   Laterality: LLE     Aquacel dressing is clean, dry and intact.      (+) PF/DF/EHL/FHL 5/5     Diminished sensation to light touch      2+ DP/PT pulse  Calves soft, non-tender bilaterally    Xray: < from: Xray Knee 1 or 2 Views, Left (12.07.23 @ 09:12) >  IMPRESSION:     Unconstrained left total knee prosthesis implanted.     Surgical clips in posteromedial knee soft tissues.       Intact aligned hardware and no periprosthetic fractures.     Postoperative soft tissue changes.     Correlate with intraoperative findings.    A/P: 73y Male s/p Left Total Knee Arthroplasty with ASHLEIGH robotic assist. VSS. NAD  -PT/OT: WBAT/OOB  -IS bedside  -Ice/elevation   -DVT PPx: Aspirin 81mg BID, SCD, Early OOB and Amb  -GI PPx: Protonix 40mg   -Pain Control     -No narcotics  -Continue post-op abx x 24hrs  -f/u AM labs.   -LENARD: has own CPAP machine  -T2DM:     -low dose insulin sliding scale     -Lantus 15units tonight, resume home 30units tomorrow night     -Home Admelog 10units with meals  -Dispo planning: anticipating home    Pavel Patel PA-C  Orthopedic Surgery Team  Team Pager #[phone] Post-Operative Check    Pt evaluated at bedside resting without complaints. Pt states pain is well tolerated. Pt admits he doesn't like how he feels when he takes oxycodone and was getting tremors when he took Tramadol.  Pt admits he alternates between Tylenol and Alieve to manage his pain. No Chest Pain, SOB, N/V.    Vitals:  T(C): 36.5 (12-07-23 @ 14:15), Max: 37 (12-07-23 @ 05:35)  HR: 81 (12-07-23 @ 14:15) (76 - 86)  BP: 118/72 (12-07-23 @ 14:15) (93/66 - 151/88)  RR: 18 (12-07-23 @ 14:15) (15 - 18)  SpO2: 94% (12-07-23 @ 14:15) (94% - 100%)    Exam:  Alert and Oriented, No Acute Distress. VSS.   Laterality: LLE     Aquacel dressing is clean, dry and intact.      (+) PF/DF/EHL/FHL 5/5     Diminished sensation to light touch      2+ DP/PT pulse  Calves soft, non-tender bilaterally    Xray: < from: Xray Knee 1 or 2 Views, Left (12.07.23 @ 09:12) >  IMPRESSION:     Unconstrained left total knee prosthesis implanted.     Surgical clips in posteromedial knee soft tissues.       Intact aligned hardware and no periprosthetic fractures.     Postoperative soft tissue changes.     Correlate with intraoperative findings.    A/P: 73y Male s/p Left Total Knee Arthroplasty with ASHLEIGH robotic assist. VSS. NAD  -PT/OT: WBAT/OOB  -IS bedside  -Ice/elevation   -DVT PPx: Aspirin 81mg BID, SCD, Early OOB and Amb  -GI PPx: Protonix 40mg   -Pain Control     -No narcotics  -Continue post-op abx x 24hrs  -f/u AM labs.   -LENARD: has own CPAP machine  -T2DM:     -low dose insulin sliding scale     -Lantus 15units tonight, resume home 30units tomorrow night     -Home Admelog 10units with meals  -Dispo planning: anticipating home    Pavel Patel PA-C  Orthopedic Surgery Team  Team Pager #[phone]

## 2023-12-07 NOTE — BRIEF OPERATIVE NOTE - NSICDXBRIEFPREOP_GEN_ALL_CORE_FT
[de-identified] : Initial visit, referred in consultation.\par His chief complaint is "nasal drip, mucus during sleep, sinus pain/infection in the past".\par He begins his history by reporting to me that sometime ago he had what sounds like an odontogenic right maxillary sinusitis.  According to him it was "drained" by an oral surgeon.\par He intermittently gets sinus infection.\par He wakes up in the morning with significant postnasal drip.\par He has never had formal allergy testing.\par According to him his wife is witnessing sleep disturbed breathing and he is being scheduled for a sleep study.\par \par He is also complaining of bilateral pressure in the back of his head.\par He does not have a history of headache syndromes.  He is not complaining of any visual aura. PRE-OP DIAGNOSIS:  Osteoarthritis of left knee 07-Dec-2023 08:55:18  Leo Thomas

## 2023-12-07 NOTE — PHYSICAL THERAPY INITIAL EVALUATION ADULT - BALANCE TRAINING, PT EVAL
PROCEDURE:XR SPINE CERVICAL 2 OR 3 VIEWS

 

COMPARISON:None.

 

INDICATIONS:NECK PAIN

 

TECHNIQUE:AP, lateral, and dens views of the cervical spine are provided.

 

FINDINGS:

 

ALIGNMENT:No measurable listhesis.

VERTEBRAE:Small ventral disc osteophytes C3-4 through C6-7.  

Uncovertebral/facet hypertrophy noted at C3-4 through C5-6.

DISK SPACES:Disc spaces are maintained.

CERVICAL RIBS:None.

OTHER:Odontoid largely obscured by dentition on odontoid view.

 

 

CONCLUSION:

1.  Mild multilevel cervical spondylosis.

2.  Limited odontoid view.

 

 

 

 

Dictated by: Kenneth Alfaro M.D. on 06/17/2021 at 01:48 PM     

Electronically Signed By: Kenneth Alfaro M.D. on 06/17/2021 at 01:49 PM
PROCEDURE:XRAY SPINE LUMBAR 2-3 VWS

 

COMPARISON:[facility], CT, CT ABD/PELVIS W/O, 01/03/2017, 03:58 

PM.

 

INDICATIONS:LOW BACK PAIN

 

TECHNIQUE:AP, lateral, and coned down lateral views of the lumbar spine are 

provided.  

 

FINDINGS:

ALIGNMENT:No measurable listhesis.

VERTEBRAE:Endplate degenerative changes with ventral disc osteophytes spanning 

L2-3 through L5-S1.  Endplate sclerosis L5-S1.

DISK SPACES:Multilevel disc space narrowing most severe at L5-S1.

SPONDYLOLISTHESIS:None.

SACROILIAC JOINTS:Mild sacroiliac joint degenerative changes.

OTHER:Presumed cholecystectomy clips in the right upper abdominal quadrant.

 

 

CONCLUSION:

1.  Lumbar spondylosis most severe at L5-S1.

 

 

 

 

Dictated by: Kenneth Alfaro M.D. on 06/17/2021 at 01:54 PM     

Electronically Signed By: Kenneth Alfaro M.D. on 06/17/2021 at 01:55 PM
GOAL: Patient will improve static and dynamic standing balance by 1/2 grade using least restrictive device within 2 weeks to improve safety and decrease risk of falls.

## 2023-12-07 NOTE — OCCUPATIONAL THERAPY INITIAL EVALUATION ADULT - ADDITIONAL COMMENTS
Pt lives in a private house w/ spouse,1 flight within, however pt can stay on 1st floor. walk in shower +grab bars +shower chair. Owns rolling walker from previous TKA.

## 2023-12-08 ENCOUNTER — TRANSCRIPTION ENCOUNTER (OUTPATIENT)
Age: 73
End: 2023-12-08

## 2023-12-08 VITALS
DIASTOLIC BLOOD PRESSURE: 72 MMHG | SYSTOLIC BLOOD PRESSURE: 128 MMHG | OXYGEN SATURATION: 93 % | TEMPERATURE: 98 F | HEART RATE: 74 BPM | RESPIRATION RATE: 18 BRPM

## 2023-12-08 LAB
ANION GAP SERPL CALC-SCNC: 8 MMOL/L — SIGNIFICANT CHANGE UP (ref 5–17)
ANION GAP SERPL CALC-SCNC: 8 MMOL/L — SIGNIFICANT CHANGE UP (ref 5–17)
BUN SERPL-MCNC: 19 MG/DL — SIGNIFICANT CHANGE UP (ref 7–23)
BUN SERPL-MCNC: 19 MG/DL — SIGNIFICANT CHANGE UP (ref 7–23)
CALCIUM SERPL-MCNC: 9 MG/DL — SIGNIFICANT CHANGE UP (ref 8.4–10.5)
CALCIUM SERPL-MCNC: 9 MG/DL — SIGNIFICANT CHANGE UP (ref 8.4–10.5)
CHLORIDE SERPL-SCNC: 101 MMOL/L — SIGNIFICANT CHANGE UP (ref 96–108)
CHLORIDE SERPL-SCNC: 101 MMOL/L — SIGNIFICANT CHANGE UP (ref 96–108)
CO2 SERPL-SCNC: 27 MMOL/L — SIGNIFICANT CHANGE UP (ref 22–31)
CO2 SERPL-SCNC: 27 MMOL/L — SIGNIFICANT CHANGE UP (ref 22–31)
CREAT SERPL-MCNC: 0.85 MG/DL — SIGNIFICANT CHANGE UP (ref 0.5–1.3)
CREAT SERPL-MCNC: 0.85 MG/DL — SIGNIFICANT CHANGE UP (ref 0.5–1.3)
EGFR: 92 ML/MIN/1.73M2 — SIGNIFICANT CHANGE UP
EGFR: 92 ML/MIN/1.73M2 — SIGNIFICANT CHANGE UP
GLUCOSE SERPL-MCNC: 140 MG/DL — HIGH (ref 70–99)
GLUCOSE SERPL-MCNC: 140 MG/DL — HIGH (ref 70–99)
HCT VFR BLD CALC: 36.6 % — LOW (ref 39–50)
HCT VFR BLD CALC: 36.6 % — LOW (ref 39–50)
HGB BLD-MCNC: 11.6 G/DL — LOW (ref 13–17)
HGB BLD-MCNC: 11.6 G/DL — LOW (ref 13–17)
MCHC RBC-ENTMCNC: 27.6 PG — SIGNIFICANT CHANGE UP (ref 27–34)
MCHC RBC-ENTMCNC: 27.6 PG — SIGNIFICANT CHANGE UP (ref 27–34)
MCHC RBC-ENTMCNC: 31.7 GM/DL — LOW (ref 32–36)
MCHC RBC-ENTMCNC: 31.7 GM/DL — LOW (ref 32–36)
MCV RBC AUTO: 87.1 FL — SIGNIFICANT CHANGE UP (ref 80–100)
MCV RBC AUTO: 87.1 FL — SIGNIFICANT CHANGE UP (ref 80–100)
NRBC # BLD: 0 /100 WBCS — SIGNIFICANT CHANGE UP (ref 0–0)
NRBC # BLD: 0 /100 WBCS — SIGNIFICANT CHANGE UP (ref 0–0)
PLATELET # BLD AUTO: 252 K/UL — SIGNIFICANT CHANGE UP (ref 150–400)
PLATELET # BLD AUTO: 252 K/UL — SIGNIFICANT CHANGE UP (ref 150–400)
POTASSIUM SERPL-MCNC: 3.7 MMOL/L — SIGNIFICANT CHANGE UP (ref 3.5–5.3)
POTASSIUM SERPL-MCNC: 3.7 MMOL/L — SIGNIFICANT CHANGE UP (ref 3.5–5.3)
POTASSIUM SERPL-SCNC: 3.7 MMOL/L — SIGNIFICANT CHANGE UP (ref 3.5–5.3)
POTASSIUM SERPL-SCNC: 3.7 MMOL/L — SIGNIFICANT CHANGE UP (ref 3.5–5.3)
RBC # BLD: 4.2 M/UL — SIGNIFICANT CHANGE UP (ref 4.2–5.8)
RBC # BLD: 4.2 M/UL — SIGNIFICANT CHANGE UP (ref 4.2–5.8)
RBC # FLD: 16.1 % — HIGH (ref 10.3–14.5)
RBC # FLD: 16.1 % — HIGH (ref 10.3–14.5)
SODIUM SERPL-SCNC: 136 MMOL/L — SIGNIFICANT CHANGE UP (ref 135–145)
SODIUM SERPL-SCNC: 136 MMOL/L — SIGNIFICANT CHANGE UP (ref 135–145)
WBC # BLD: 12.1 K/UL — HIGH (ref 3.8–10.5)
WBC # BLD: 12.1 K/UL — HIGH (ref 3.8–10.5)
WBC # FLD AUTO: 12.1 K/UL — HIGH (ref 3.8–10.5)
WBC # FLD AUTO: 12.1 K/UL — HIGH (ref 3.8–10.5)

## 2023-12-08 PROCEDURE — 85027 COMPLETE CBC AUTOMATED: CPT

## 2023-12-08 PROCEDURE — S2900: CPT

## 2023-12-08 PROCEDURE — C1776: CPT

## 2023-12-08 PROCEDURE — 82962 GLUCOSE BLOOD TEST: CPT

## 2023-12-08 PROCEDURE — 97165 OT EVAL LOW COMPLEX 30 MIN: CPT

## 2023-12-08 PROCEDURE — 80048 BASIC METABOLIC PNL TOTAL CA: CPT

## 2023-12-08 PROCEDURE — 27447 TOTAL KNEE ARTHROPLASTY: CPT | Mod: LT

## 2023-12-08 PROCEDURE — C1713: CPT

## 2023-12-08 PROCEDURE — 97530 THERAPEUTIC ACTIVITIES: CPT

## 2023-12-08 PROCEDURE — 97161 PT EVAL LOW COMPLEX 20 MIN: CPT

## 2023-12-08 PROCEDURE — 97116 GAIT TRAINING THERAPY: CPT

## 2023-12-08 PROCEDURE — 20985 CPTR-ASST DIR MS PX: CPT

## 2023-12-08 PROCEDURE — 73560 X-RAY EXAM OF KNEE 1 OR 2: CPT

## 2023-12-08 RX ORDER — ASPIRIN 500 MG
1 TABLET ORAL
Qty: 60 | Refills: 0 | DISCHARGE
Start: 2023-12-08 | End: 2024-01-06

## 2023-12-08 RX ORDER — POLYETHYLENE GLYCOL 3350 17 G/17G
17 POWDER, FOR SOLUTION ORAL
Qty: 0 | Refills: 0 | DISCHARGE
Start: 2023-12-08

## 2023-12-08 RX ORDER — ASPIRIN/CALCIUM CARB/MAGNESIUM 324 MG
1 TABLET ORAL
Qty: 60 | Refills: 0
Start: 2023-12-08 | End: 2024-01-06

## 2023-12-08 RX ORDER — INSULIN GLARGINE 100 [IU]/ML
30 INJECTION, SOLUTION SUBCUTANEOUS
Qty: 0 | Refills: 0 | DISCHARGE

## 2023-12-08 RX ORDER — ASPIRIN/CALCIUM CARB/MAGNESIUM 324 MG
1 TABLET ORAL
Qty: 0 | Refills: 0 | DISCHARGE
Start: 2023-12-08

## 2023-12-08 RX ORDER — PANTOPRAZOLE SODIUM 20 MG/1
1 TABLET, DELAYED RELEASE ORAL
Qty: 0 | Refills: 0 | DISCHARGE
Start: 2023-12-08

## 2023-12-08 RX ORDER — PANTOPRAZOLE SODIUM 20 MG/1
1 TABLET, DELAYED RELEASE ORAL
Qty: 30 | Refills: 0
Start: 2023-12-08 | End: 2024-01-06

## 2023-12-08 RX ORDER — ACETAMINOPHEN 500 MG
3 TABLET ORAL
Qty: 0 | Refills: 0 | DISCHARGE
Start: 2023-12-08

## 2023-12-08 RX ORDER — INSULIN ASPART 100 [IU]/ML
0 INJECTION, SOLUTION SUBCUTANEOUS
Qty: 0 | Refills: 0 | DISCHARGE

## 2023-12-08 RX ORDER — ASPIRIN/CALCIUM CARB/MAGNESIUM 324 MG
1 TABLET ORAL
Refills: 0 | DISCHARGE

## 2023-12-08 RX ADMIN — LOSARTAN POTASSIUM 25 MILLIGRAM(S): 100 TABLET, FILM COATED ORAL at 05:20

## 2023-12-08 RX ADMIN — Medication 1 TABLET(S): at 11:12

## 2023-12-08 RX ADMIN — Medication 25 MILLIGRAM(S): at 05:20

## 2023-12-08 RX ADMIN — Medication 15 MILLIGRAM(S): at 10:10

## 2023-12-08 RX ADMIN — Medication 15 MILLIGRAM(S): at 09:43

## 2023-12-08 RX ADMIN — CELECOXIB 200 MILLIGRAM(S): 200 CAPSULE ORAL at 05:20

## 2023-12-08 RX ADMIN — PANTOPRAZOLE SODIUM 40 MILLIGRAM(S): 20 TABLET, DELAYED RELEASE ORAL at 07:52

## 2023-12-08 RX ADMIN — Medication 10 UNIT(S): at 07:57

## 2023-12-08 RX ADMIN — Medication 15 MILLIGRAM(S): at 04:11

## 2023-12-08 RX ADMIN — Medication 500 MILLIGRAM(S): at 05:20

## 2023-12-08 RX ADMIN — SODIUM CHLORIDE 500 MILLILITER(S): 9 INJECTION INTRAMUSCULAR; INTRAVENOUS; SUBCUTANEOUS at 07:58

## 2023-12-08 RX ADMIN — Medication 101.6 MILLIGRAM(S): at 06:15

## 2023-12-08 RX ADMIN — Medication 81 MILLIGRAM(S): at 06:14

## 2023-12-08 RX ADMIN — Medication 1000 MILLIGRAM(S): at 08:20

## 2023-12-08 RX ADMIN — Medication 400 MILLIGRAM(S): at 07:56

## 2023-12-08 NOTE — DISCHARGE NOTE PROVIDER - CARE PROVIDER_API CALL
Abhinav Grimes  Orthopaedic Surgery  [address], [address]  [place] [address]  Phone: [phone]  Fax: [phone]  Follow Up Time:    Abhinav Grimes  Orthopaedic Surgery  [address], [address]  [place] [address]  Phone: [phone]  Fax: [phone]  Follow Up Time:

## 2023-12-08 NOTE — DISCHARGE NOTE PROVIDER - NSDCCPTREATMENT_GEN_ALL_CORE_FT
PRINCIPAL PROCEDURE  Procedure: Robot-assisted total replacement of left knee joint  Findings and Treatment:

## 2023-12-08 NOTE — DISCHARGE NOTE PROVIDER - NSDCFUSCHEDAPPT_GEN_ALL_CORE_FT
[facility] Physician [facility]  ORTHOSURG [address]  Scheduled Appointment: 12/22/2023     [facility] Physician [facility]  ORTHOSURG [address]  Scheduled Appointment: 12/22/2023

## 2023-12-08 NOTE — DISCHARGE NOTE PROVIDER - HOSPITAL COURSE
History of Present Illness	  73 yr old male with hx of CAD (CABG), LENARD ( on CPAP), Diabetes Mellitus type 2 (oral meds and Insulin), HTN, Morbid Obesity, Osteoarthritis of left knee. Noted increase pain left knee difficulty walking work up referred for knee replacement.    Goal: walk without pain    Allergies/Medications:   Allergies:        Intolerances:  	oxycodone: Drug, Other, confusion  	tramadol: Drug, Other, body tremors     Past Medical, Past Surgical, and Family History:  PAST MEDICAL HISTORY:  -CAD (coronary artery disease) s/p CABG  (11/2015)  -Hyperlipidemia   -Hypertension   -LENARD on CPAP   -Primary osteoarthritis of right knee   -Type 2 diabetes mellitus with diabetic retinopathy.     PAST SURGICAL HISTORY:  -H/O colonoscopy with polypectomy 2018  -H/O total knee replacement, right   -History of tonsillectomy child  -History of varicocele   -S/P CABG x 4 11/2015  -S/P excision of lipoma left cheek, ~10 years ago  -S/P laparoscopic cholecystectomy.     Patient presents to the hospital for elective surgery, underwent a left total knee replacement-  -tolerated procedure without complications.  Patient was evaluated by Physical therapy whom recommended home  with out patient PT for discharge plan.  Patient is stable for discharge when cleared by PT.   History of Present Illness	  73 yr old male with hx of CAD (CABG), LENARD ( on CPAP), Diabetes Mellitus type 2 (oral meds and Insulin), HTN, Morbid Obesity, Osteoarthritis of left knee. Noted increase pain left knee difficulty walking work up referred for knee replacement.    Goal: walk without pain    Allergies/Medications:   Allergies:        Intolerances:  	oxycodone: Drug, Other, confusion  	tramadol: Drug, Other, body tremors     Past Medical, Past Surgical, and Family History:  PAST MEDICAL HISTORY:  -CAD (coronary artery disease) s/p CABG  (11/2015)  -Hyperlipidemia   -Hypertension   -LENARD on CPAP   -Primary osteoarthritis of right knee   -Type 2 diabetes mellitus with diabetic retinopathy.     PAST SURGICAL HISTORY:  -H/O colonoscopy with polypectomy 2018  -H/O total knee replacement, right   -History of tonsillectomy child  -History of varicocele   -S/P CABG x 4 11/2015  -S/P excision of lipoma left cheek, ~10 years ago  -S/P laparoscopic cholecystectomy.    12/7/23: Patient presents to the hospital for elective surgery, underwent a left total knee replacement-  -tolerated procedure without complications.  Patient was evaluated by Physical therapy whom recommended home  with out patient PT for discharge plan.  Patient is stable for discharge when cleared by PT.

## 2023-12-08 NOTE — PROGRESS NOTE ADULT - PROBLEM SELECTOR PLAN 1
PT/OT-WBAT  IS  DVT PPx  Pain Control  Continue Current Tx  ck am labs  D/c anticipated home for today    Foster Mcarthur PA-C  Team Pager: #[phone] PT/OT-WBAT  IS  DVT PPx  Pain Control  Continue Current Tx  ck am labs  D/c anticipated home for today    Foster Mcarthur PA-C  Team Pager: #[phone]

## 2023-12-08 NOTE — DISCHARGE NOTE NURSING/CASE MANAGEMENT/SOCIAL WORK - PATIENT PORTAL LINK FT
You can access the FollowMyHealth Patient Portal offered by [facility] by registering at the following website: [email or web address]. By joining [employer]’s FollowMyHealth portal, you will also be able to view your health information using other applications (apps) compatible with our system. You can access the FollowMyHealth Patient Portal offered by [facility] by registering at the following website: [email or web address]. By joining [facility]s FollowMyHealth portal, you will also be able to view your health information using other applications (apps) compatible with our system.

## 2023-12-08 NOTE — DISCHARGE NOTE PROVIDER - NSDCMRMEDTOKEN_GEN_ALL_CORE_FT
acetaminophen 325 mg oral tablet: 3 tab(s) orally every 8 hours  Alpha Lipoic Acid 600 mg po 2 x day:   aspirin 81 mg oral delayed release tablet: 1 tab(s) orally 2 times a day Continue for 30 days, resume prior dosing  Coenzyme Q10 200 mg oral capsule: 200 milligram(s) orally once a day  Colace 100 mg oral capsule: 1 cap(s) orally 2 times a day  ezetimibe 10 mg oral tablet: 1 tab(s) orally once a day (at bedtime)  Fish Oil 1000 mg oral capsule: 1 cap(s) orally once a day  glucosamine-chondroitin 1.5 g-1.2 g oral capsule: 3 cap(s) orally 2 times a day  indapamide 1.25 mg oral tablet: 1 tab(s) orally once a day  Lantus 100 units/mL subcutaneous solution: 30 unit(s) subcutaneous once a day (at bedtime)  losartan 25 mg oral tablet: 1 tab(s) orally once a day  metoprolol succinate 25 mg oral capsule, extended release: 1 cap(s) orally 2 times a day  Multiple Vitamins oral tablet: 1 tab(s) orally once a day  naproxen 500 mg oral tablet: 1 tab(s) orally 2 times a day Continue for 2 weeks, then stop  NovoLOG 100 units/mL injectable solution: injectable 3 times a day 10units breakfast  15 units lunch 15 units dinner  Ozempic 2 mg/1.5 mL (0.25 mg or 0.5 mg dose) subcutaneous solution: 0.5 milligram(s) subcutaneously once a week Sunday  pantoprazole 40 mg oral delayed release tablet: 1 tab(s) orally once a day (before a meal) Continue for 30 days  polyethylene glycol 3350 oral powder for reconstitution: 17 gram(s) orally once a day (at bedtime)  pravastatin 20 mg oral tablet: 1 tab(s) orally once a day (at bedtime)  senna (sennosides) 8.6 mg oral tablet: 1 tab(s) orally 2 times a day  Synjardy 12.5 mg-1000 mg oral tablet: 1 tab(s) orally 2 times a day  Vitamin D3 one tab  po daily:    acetaminophen 325 mg oral tablet: 3 tab(s) orally every 8 hours  Alpha Lipoic Acid 600 mg po 2 x day:   aspirin 81 mg oral delayed release tablet: 1 tab(s) orally 2 times a day Continue for 30 days, resume prior dosing MDD: 2  Coenzyme Q10 200 mg oral capsule: 200 milligram(s) orally once a day  Colace 100 mg oral capsule: 1 cap(s) orally 2 times a day  ezetimibe 10 mg oral tablet: 1 tab(s) orally once a day (at bedtime)  Fish Oil 1000 mg oral capsule: 1 cap(s) orally once a day  glucosamine-chondroitin 1.5 g-1.2 g oral capsule: 3 cap(s) orally 2 times a day  indapamide 1.25 mg oral tablet: 1 tab(s) orally once a day  Lantus 100 units/mL subcutaneous solution: 30 unit(s) subcutaneous once a day (at bedtime)  losartan 25 mg oral tablet: 1 tab(s) orally once a day  metoprolol succinate 25 mg oral capsule, extended release: 1 cap(s) orally 2 times a day  Multiple Vitamins oral tablet: 1 tab(s) orally once a day  naproxen 500 mg oral tablet: 1 tab(s) orally 2 times a day Continue for 2 weeks, then stop MDD: 2  NovoLOG 100 units/mL injectable solution: injectable 3 times a day 10units breakfast  15 units lunch 15 units dinner  Ozempic 2 mg/1.5 mL (0.25 mg or 0.5 mg dose) subcutaneous solution: 0.5 milligram(s) subcutaneously once a week Sunday  pantoprazole 40 mg oral delayed release tablet: 1 tab(s) orally once a day (before a meal) Continue for 30 days MDD: 1  polyethylene glycol 3350 oral powder for reconstitution: 17 gram(s) orally once a day (at bedtime)  pravastatin 20 mg oral tablet: 1 tab(s) orally once a day (at bedtime)  senna (sennosides) 8.6 mg oral tablet: 1 tab(s) orally 2 times a day  Synjardy 12.5 mg-1000 mg oral tablet: 1 tab(s) orally 2 times a day  Vitamin D3 one tab  po daily:

## 2023-12-08 NOTE — DISCHARGE NOTE PROVIDER - CARE PROVIDERS DIRECT ADDRESSES
[email or web address]

## 2023-12-08 NOTE — DISCHARGE NOTE PROVIDER - NSDCFUADDINST_GEN_ALL_CORE_FT
Please call Dr. Grimes' s office within next few days to schedule a follow up appointment about 14 days after surgery (If not already scheduled).  Recommend follow up with medical MD, within next 4 weeks.   Dressing should be removed per date on dressing.   Patient may shower, limit direct water to dressing, if wet pat dry.   Out of bed, ambulate, weight bearing as tolerated-   Physical therapy to assist with exercise and help increase endurance.   Please contact Doctors office regarding arrangements for out patient Physical therapy.

## 2023-12-08 NOTE — PROGRESS NOTE ADULT - SUBJECTIVE AND OBJECTIVE BOX
Post op Day [1 ]    Patient resting without complaints.  No chest pain, SOB, N/V.    T(C): 37 (12-08-23 @ 04:48), Max: 37 (12-07-23 @ 07:11)  HR: 85 (12-08-23 @ 04:48) (61 - 107)  BP: 110/61 (12-08-23 @ 04:48) (93/66 - 151/88)  RR: 18 (12-08-23 @ 04:48) (15 - 18)  SpO2: 97% (12-08-23 @ 04:48) (94% - 100%)  Wt(kg): --    Exam:  Alert and Oriented, No Acute Distress  LLE aquacel with scant spotting, otherwise c/d/i, soft/compressible compartments  Calves Soft, Non-tender bilaterally  +PF/DF/EHL/FHL  SILT (has baseline neuropathy)  +DP Pulse                         11.6   12.10 )-----------( 252      ( 08 Dec 2023 06:10 )             36.6

## 2023-12-08 NOTE — DISCHARGE NOTE NURSING/CASE MANAGEMENT/SOCIAL WORK - NSDCPEFALRISK_GEN_ALL_CORE
For information on Fall & Injury Prevention, visit: [email or web address] OR  [email or web address] OR  https://www.cdc.gov/steadi/patient.html For information on Fall & Injury Prevention, visit: [email or web address] OR  [email or web address] OR  https://www.cdc.gov/steadi/patient.html

## 2023-12-09 ENCOUNTER — TRANSCRIPTION ENCOUNTER (OUTPATIENT)
Age: 73
End: 2023-12-09

## 2023-12-10 ENCOUNTER — NON-APPOINTMENT (OUTPATIENT)
Age: 73
End: 2023-12-10

## 2023-12-12 ENCOUNTER — TRANSCRIPTION ENCOUNTER (OUTPATIENT)
Age: 73
End: 2023-12-12

## 2023-12-13 ENCOUNTER — NON-APPOINTMENT (OUTPATIENT)
Age: 73
End: 2023-12-13

## 2023-12-14 ENCOUNTER — NON-APPOINTMENT (OUTPATIENT)
Age: 73
End: 2023-12-14

## 2023-12-14 ENCOUNTER — TRANSCRIPTION ENCOUNTER (OUTPATIENT)
Age: 73
End: 2023-12-14

## 2023-12-15 ENCOUNTER — TRANSCRIPTION ENCOUNTER (OUTPATIENT)
Age: 73
End: 2023-12-15

## 2023-12-17 ENCOUNTER — TRANSCRIPTION ENCOUNTER (OUTPATIENT)
Age: 73
End: 2023-12-17

## 2023-12-22 ENCOUNTER — APPOINTMENT (OUTPATIENT)
Dept: ORTHOPEDIC SURGERY | Facility: CLINIC | Age: 73
End: 2023-12-22
Payer: MEDICARE

## 2023-12-22 VITALS — WEIGHT: 239 LBS | HEIGHT: 65 IN | BODY MASS INDEX: 39.82 KG/M2

## 2023-12-22 PROCEDURE — 99024 POSTOP FOLLOW-UP VISIT: CPT

## 2023-12-22 PROCEDURE — 73564 X-RAY EXAM KNEE 4 OR MORE: CPT | Mod: 26,LT

## 2023-12-22 NOTE — HISTORY OF PRESENT ILLNESS
[de-identified] : Status-post left total knee  arthroplasty here for initial postoperative evaluation. Excellent progress is noted in terms of pain and restoration of function. Pain is well controlled with oral medications. There has been no change in medical health since discharge. The patient does require assistive devices.

## 2023-12-22 NOTE — DISCUSSION/SUMMARY
[de-identified] : The patient is doing well after joint replacement surgery. Written infectious precautions were reviewed. The patient will progress with physical therapy at this time and they will work on transitioning from requiring assistive devices for ambulation. Anti-coagulant therapy will be discontinued at 1 month post surgery for the purpose of orthopedic thromboembolism prophylaxis. Return around the 6 week anniversary from surgery for follow-up evaluation.

## 2023-12-22 NOTE — PHYSICAL EXAM
[de-identified] : Well developed, well nourished in no apparent distress, awake, alert and orientated to person, place and time with appropriate mood and affect Respirations are even and unlabored. Gait evaluation does not reveal a limp. There is no inguinal adenopathy. The affected limb is well-perfused with palpable pedal pulse, without skin lesions, shows a grossly normal motor and sensory examination. Incision is CDI. Knee motion is 0-90 [de-identified] : AP, lateral, sunrise knee and tunnel  x-rays of the left knee were ordered and obtained in the office and demonstrate satisfactory position and alignment of the components are present. No signs of loosening are seen.

## 2023-12-29 ENCOUNTER — TRANSCRIPTION ENCOUNTER (OUTPATIENT)
Age: 73
End: 2023-12-29

## 2024-01-08 ENCOUNTER — TRANSCRIPTION ENCOUNTER (OUTPATIENT)
Age: 74
End: 2024-01-08

## 2024-01-19 ENCOUNTER — NON-APPOINTMENT (OUTPATIENT)
Age: 74
End: 2024-01-19

## 2024-01-19 ASSESSMENT — KOOS JR
STANDING UPRIGHT: MODERATE
HOW SEVERE IS YOUR KNEE STIFFNESS AFTER FIRST WAKING IN MORNING: SEVERE
KOOS JR RAW SCORE: 16
STRAIGHTENING KNEE FULLY: MODERATE
TWISING OR PIVOTING ON KNEE: SEVERE
RISING FROM SITTING: MODERATE
GOING UP OR DOWN STAIRS: MODERATE
BENDING TO THE FLOOR TO PICK UP OBJECT: MODERATE

## 2024-01-24 ENCOUNTER — TRANSCRIPTION ENCOUNTER (OUTPATIENT)
Age: 74
End: 2024-01-24

## 2024-01-31 ENCOUNTER — APPOINTMENT (OUTPATIENT)
Dept: ORTHOPEDIC SURGERY | Facility: CLINIC | Age: 74
End: 2024-01-31
Payer: MEDICARE

## 2024-01-31 VITALS
HEART RATE: 77 BPM | WEIGHT: 234 LBS | DIASTOLIC BLOOD PRESSURE: 72 MMHG | BODY MASS INDEX: 38.99 KG/M2 | SYSTOLIC BLOOD PRESSURE: 130 MMHG | HEIGHT: 65 IN

## 2024-01-31 DIAGNOSIS — M17.0 BILATERAL PRIMARY OSTEOARTHRITIS OF KNEE: ICD-10-CM

## 2024-01-31 DIAGNOSIS — M25.562 PAIN IN LEFT KNEE: ICD-10-CM

## 2024-01-31 DIAGNOSIS — M17.12 UNILATERAL PRIMARY OSTEOARTHRITIS, LEFT KNEE: ICD-10-CM

## 2024-01-31 PROCEDURE — 73564 X-RAY EXAM KNEE 4 OR MORE: CPT | Mod: LT

## 2024-01-31 PROCEDURE — 99214 OFFICE O/P EST MOD 30 MIN: CPT | Mod: 24

## 2024-01-31 NOTE — DISCUSSION/SUMMARY
[de-identified] : The patient was doing well after left total knee arthroplasty but now has acute onset of left medial knee pain.  Etiology is not entirely clear.  This may be a strained tendon or ligament. Continue to be weight bearing as tolerated without restriction. Daily home exercise program recommended.  He has Aleve at home and will try this for 1 week as it is allowed per his cardiologist despite his history of CABG.  Follow-up with me in 3 weeks if no better.

## 2024-01-31 NOTE — HISTORY OF PRESENT ILLNESS
[de-identified] : This is very nice 73-year-old gentleman experiencing acute onset of left knee pain, which is severe in intensity.  He is approximately 12-month status post left total knee arthroplasty by me.  He is doing extremely well and actually scheduled to come in today for a routine visit but then yesterday was working in the gym and injured his left knee.  He is not sure exactly what happened but he was going over some small hurdles.  He felt extreme pain in the knee.  The pain substantially limits activities of daily living. Walking tolerance is reduced.

## 2024-01-31 NOTE — PHYSICAL EXAM
[de-identified] : Patient is well nourished, well-developed, in no acute distress, with appropriate mood and affect. The patient is oriented to time, place, and person. Respirations are even and unlabored. Gait evaluation does not reveal a limp. There is no inguinal adenopathy. Examination of the contralateral knee shows normal range of motion, strength, no tenderness, and intact skin. The operative limb is well-perfused, has a well appearing surgical scar, and shows a grossly normal motor and sensory examination. Knee motion is painless and the left knee moves from 0 to 125 degrees. The knee is stable within that range-of-motion to AP and ML stress.  Tender to palpation about the medial aspect of the patella.  The alignment of the knee is neutral. Muscle strength is normal. Quadriceps and hamstring muscle strength is normal bilaterally. Pedal pulses are palpable.  [de-identified] : AP, lateral, tunnel, and sunrise knee x-rays of the left knee were ordered and obtained in the office and demonstrate satisfactory position and alignment of the components are present. No signs of loosening are seen.

## 2024-02-07 ENCOUNTER — TRANSCRIPTION ENCOUNTER (OUTPATIENT)
Age: 74
End: 2024-02-07

## 2024-02-28 ENCOUNTER — TRANSCRIPTION ENCOUNTER (OUTPATIENT)
Age: 74
End: 2024-02-28

## 2024-03-11 ENCOUNTER — RX RENEWAL (OUTPATIENT)
Age: 74
End: 2024-03-11

## 2024-03-12 RX ORDER — EZETIMIBE 10 MG/1
10 TABLET ORAL
Qty: 90 | Refills: 2 | Status: ACTIVE | COMMUNITY
Start: 2022-03-23

## 2024-03-12 RX ORDER — INDAPAMIDE 1.25 MG/1
1.25 TABLET, FILM COATED ORAL
Qty: 90 | Refills: 2 | Status: ACTIVE | COMMUNITY
Start: 2020-12-16

## 2024-04-02 ENCOUNTER — NON-APPOINTMENT (OUTPATIENT)
Age: 74
End: 2024-04-02

## 2024-04-09 ENCOUNTER — RX RENEWAL (OUTPATIENT)
Age: 74
End: 2024-04-09

## 2024-04-16 ENCOUNTER — NON-APPOINTMENT (OUTPATIENT)
Age: 74
End: 2024-04-16

## 2024-04-16 ENCOUNTER — APPOINTMENT (OUTPATIENT)
Dept: CARDIOLOGY | Facility: CLINIC | Age: 74
End: 2024-04-16
Payer: MEDICARE

## 2024-04-16 VITALS
BODY MASS INDEX: 39.82 KG/M2 | OXYGEN SATURATION: 95 % | SYSTOLIC BLOOD PRESSURE: 125 MMHG | WEIGHT: 239 LBS | DIASTOLIC BLOOD PRESSURE: 73 MMHG | HEART RATE: 82 BPM | HEIGHT: 65 IN

## 2024-04-16 DIAGNOSIS — I10 ESSENTIAL (PRIMARY) HYPERTENSION: ICD-10-CM

## 2024-04-16 DIAGNOSIS — I25.10 ATHEROSCLEROTIC HEART DISEASE OF NATIVE CORONARY ARTERY W/OUT ANGINA PECTORIS: ICD-10-CM

## 2024-04-16 PROCEDURE — 99214 OFFICE O/P EST MOD 30 MIN: CPT

## 2024-04-16 PROCEDURE — 93000 ELECTROCARDIOGRAM COMPLETE: CPT

## 2024-04-16 PROCEDURE — G2211 COMPLEX E/M VISIT ADD ON: CPT

## 2024-04-16 NOTE — HISTORY OF PRESENT ILLNESS
[FreeTextEntry1] : I saw Ilsa Samuel in the office today for a follow up visit.  He was last seen in the office about 6 months ago.  He is now 73 years old, with a history of diabetes, hypertension and hyperlipidemia.  He has a history of coronary artery disease, diagnosed in November 2015, in the setting of unstable angina.  At that time, catheterization revealed a left dominant circulation, with an 80% stenosis of the distal left main, a 90% stenosis at the ostium of the circumflex, and 80% stenosis of the first obtuse marginal branch.  He went on to have quadruple vessel bypass surgery, with a LIMA graft to the LAD, and vein grafts to a diagonal, first obtuse marginal, and posterolateral artery.  In 2017 he reported exertional dyspnea.  Catheterization was again performed, which revealed occluded native's, with patent grafts to the LAD, first diagonal, first obtuse marginal and second obtuse marginal.  He was enrolled in cardiac rehabilitation, and did well with that management strategy.     He was seen in the office in October 2022, at which time he reported progressive shortness of breath, and a lack of improvement in his exercise capacity despite his joint replacement surgery.  I could not easily differentiate a cardiac issue from a functional one, and so I recommended echocardiography and pharmacologic nuclear stress testing.  These came out well.  He presents to the office today without any new complaints he remains short of breath, without any significant change.  He remains extremely sedentary, and blames it on his back, plantar fasciitis, and a number of different factors.  He has not had any significant chest pain He specifically denies symptoms suggestive of congestive heart failure or arrhythmia. He had left TKR in December and has been recovering slowly. His pulmonologist Dr. Madden has put him on Trelegy, but this has not made much of an improvement.

## 2024-04-16 NOTE — DISCUSSION/SUMMARY
[FreeTextEntry1] : Ilsa has history of hypertension, hyperlipidemia, and diabetes.  He underwent three-vessel bypass graft surgery in 2015.  Repeat cardiac catheterization in 2017 showed all his bypass grafts were patent.  Stress test in October 2022 revealed no evidence of ischemia.  Recent echocardiogram performed in October 2022,  demonstrated a normal ejection fraction without significant valvular heart disease.  Carotid Doppler last year showed mild nonobstructing plaque.  Exam and ECG are unremarkable.  Unfortunately, he has significant shortness of breath with activity.  At this point, it seems that this is related to his obesity and deconditioning, and not any structural heart disease, or any significant lung disease.   He remains sedentary, and obese.  We had an extensive conversation about the importance of exercise, and weight loss.  I am not sure if he can comply, or is interested in complying, but we discussed that at length today.  His wife is in agreement.  No additional testing is needed at this time.  He will see me in the office again in about 3 months.  If his breathing has not improved despite best attempts at improving his conditioning, I would consider some additional testing. [EKG obtained to assist in diagnosis and management of assessed problem(s)] : EKG obtained to assist in diagnosis and management of assessed problem(s)

## 2024-04-19 RX ORDER — PRAVASTATIN SODIUM 20 MG/1
20 TABLET ORAL DAILY
Refills: 0 | Status: ACTIVE | COMMUNITY
Start: 2024-04-19

## 2024-05-18 ENCOUNTER — NON-APPOINTMENT (OUTPATIENT)
Age: 74
End: 2024-05-18

## 2024-05-18 ASSESSMENT — KOOS JR
HOW SEVERE IS YOUR KNEE STIFFNESS AFTER FIRST WAKING IN MORNING: MILD
RISING FROM SITTING: MILD
TWISING OR PIVOTING ON KNEE: MILD
BENDING TO THE FLOOR TO PICK UP OBJECT: MODERATE
KOOS JR RAW SCORE: 6
GOING UP OR DOWN STAIRS: MILD

## 2024-06-05 ENCOUNTER — APPOINTMENT (OUTPATIENT)
Dept: ORTHOPEDIC SURGERY | Facility: CLINIC | Age: 74
End: 2024-06-05

## 2024-06-18 ENCOUNTER — APPOINTMENT (OUTPATIENT)
Dept: ORTHOPEDIC SURGERY | Facility: CLINIC | Age: 74
End: 2024-06-18
Payer: MEDICARE

## 2024-06-18 VITALS — HEIGHT: 65 IN | WEIGHT: 236 LBS | BODY MASS INDEX: 39.32 KG/M2

## 2024-06-18 DIAGNOSIS — Z96.652 PRESENCE OF LEFT ARTIFICIAL KNEE JOINT: ICD-10-CM

## 2024-06-18 PROCEDURE — 99213 OFFICE O/P EST LOW 20 MIN: CPT

## 2024-06-18 PROCEDURE — 73564 X-RAY EXAM KNEE 4 OR MORE: CPT | Mod: LT

## 2024-06-18 PROCEDURE — G2211 COMPLEX E/M VISIT ADD ON: CPT

## 2024-06-18 NOTE — PHYSICAL EXAM
[de-identified] :  Patient is well nourished, well-developed, in no acute distress, with appropriate mood and affect. The patient is oriented to time, place, and person. Respirations are even and unlabored. Gait evaluation does not reveal a limp. There is no inguinal adenopathy. Examination of the contralateral knee shows normal range of motion, strength, no tenderness, and intact skin. The operative limb is well-perfused, has a well appearing surgical scar, and shows a grossly normal motor and sensory examination. Knee motion is painless and the left knee moves from 0 to 125 degrees. The knee is stable within that range-of-motion to AP and ML stress. The alignment of the knee is neutral. Muscle strength is normal. Quadriceps and hamstring muscle strength is normal bilaterally. Pedal pulses are palpable.  [de-identified] : AP, lateral, tunnel, and sunrise knee x-rays of the left knee were ordered and obtained in the office and demonstrate satisfactory position and alignment of the components are present. No signs of loosening are seen.

## 2024-06-18 NOTE — HISTORY OF PRESENT ILLNESS
[de-identified] : This is very nice 73-year-old gentleman here for interim evaluation of left total knee arthroplasty.  His previously described pain has resolved.  The patient reports good pain relief since surgery in the replaced joint and satisfactory restoration of function in terms of activities of daily living. The patient no longer requires an assistive device for ambulation, does not require pain medication, and completed postoperative physical therapy. They report unlimited activities of daily living and unlimited walking tolerance. The patient is thrilled with their progress from surgery and ultimate outcome.

## 2024-06-18 NOTE — DISCUSSION/SUMMARY
[de-identified] : The patient is doing well after left. Continue to be weight bearing as tolerated without restriction. Daily home exercise program recommended. Follow up is recommended in 1 year.

## 2024-07-16 ENCOUNTER — NON-APPOINTMENT (OUTPATIENT)
Age: 74
End: 2024-07-16

## 2024-07-16 ENCOUNTER — APPOINTMENT (OUTPATIENT)
Dept: CARDIOLOGY | Facility: CLINIC | Age: 74
End: 2024-07-16
Payer: MEDICARE

## 2024-07-16 VITALS
WEIGHT: 239 LBS | DIASTOLIC BLOOD PRESSURE: 73 MMHG | OXYGEN SATURATION: 95 % | HEART RATE: 85 BPM | SYSTOLIC BLOOD PRESSURE: 125 MMHG | BODY MASS INDEX: 39.82 KG/M2 | HEIGHT: 65 IN

## 2024-07-16 DIAGNOSIS — R06.09 OTHER FORMS OF DYSPNEA: ICD-10-CM

## 2024-07-16 DIAGNOSIS — I65.29 OCCLUSION AND STENOSIS OF UNSPECIFIED CAROTID ARTERY: ICD-10-CM

## 2024-07-16 PROCEDURE — G2211 COMPLEX E/M VISIT ADD ON: CPT

## 2024-07-16 PROCEDURE — 93000 ELECTROCARDIOGRAM COMPLETE: CPT

## 2024-07-16 PROCEDURE — 99215 OFFICE O/P EST HI 40 MIN: CPT

## 2024-07-23 ENCOUNTER — APPOINTMENT (OUTPATIENT)
Dept: CARDIOLOGY | Facility: CLINIC | Age: 74
End: 2024-07-23
Payer: MEDICARE

## 2024-07-23 PROCEDURE — 93880 EXTRACRANIAL BILAT STUDY: CPT

## 2024-07-26 NOTE — H&P CARDIOLOGY - HISTORY OF PRESENT ILLNESS
75 y/o obese male, with LENARD, HTN, dyslipidemia, DM, and known CAD s/p 4vCABG in 2015(LIMA-->LAD, SVG-->D1, SVG/Y-graft -->OM1-->OM@2; all grafts patent on most recent Cath in 2017), who presented to his cardiologist c/o JACOME.  His most recent non-invasvive cardiac testing is as follows:     - TTE 10/17/2022: LVEF 50%, mild MR, mild LAE, normal PA pressures, trace VA and TR.    - Pharmacologic NST 10/19/2022: EF 55%. Medium sized, moderate defect in inferior wall that is fixed and improves with prone imaging.  This defect is most likely related to diaphragmatic attenuation.     Given his symptoms which may represent his anginal equivalent and known CAD with associated risk factors, he is now referred for an elective cardiac catheterization.   75 y/o obese male, former smoker with LENARD on nocturnal CPAP, HTN, dyslipidemia, DM, and known CAD s/p 4vCABG in 2015(LIMA-->LAD, SVG-->D1, SVG/Y-graft -->OM1-->OM2; all grafts patent on most recent Cath in 2017), who presented to his cardiologist c/o JACOME.  He denies any associated chest pain, palpitations, syncope, orthopnea or PND.  His most recent non-invasive cardiac testing is as follows:     - TTE 10/17/2022: LVEF 50%, mild MR, mild LAE, normal PA pressures, trace IL and TR.    - Pharmacologic NST 10/19/2022: EF 55%. Medium sized, moderate defect in inferior wall that is fixed and improves with prone imaging.  This defect is most likely related to diaphragmatic attenuation.     Given his symptoms which may represent his anginal equivalent and known CAD with associated risk factors, he is now referred for an elective cardiac catheterization.

## 2024-07-29 ENCOUNTER — OUTPATIENT (OUTPATIENT)
Dept: OUTPATIENT SERVICES | Facility: HOSPITAL | Age: 74
LOS: 1 days | End: 2024-07-29
Payer: MEDICARE

## 2024-07-29 ENCOUNTER — TRANSCRIPTION ENCOUNTER (OUTPATIENT)
Age: 74
End: 2024-07-29

## 2024-07-29 VITALS
HEIGHT: 65 IN | HEART RATE: 80 BPM | WEIGHT: 237 LBS | DIASTOLIC BLOOD PRESSURE: 79 MMHG | TEMPERATURE: 98 F | RESPIRATION RATE: 14 BRPM | SYSTOLIC BLOOD PRESSURE: 139 MMHG

## 2024-07-29 VITALS
SYSTOLIC BLOOD PRESSURE: 124 MMHG | HEART RATE: 79 BPM | OXYGEN SATURATION: 98 % | RESPIRATION RATE: 16 BRPM | DIASTOLIC BLOOD PRESSURE: 67 MMHG

## 2024-07-29 DIAGNOSIS — Z98.89 OTHER SPECIFIED POSTPROCEDURAL STATES: Chronic | ICD-10-CM

## 2024-07-29 DIAGNOSIS — Z90.49 ACQUIRED ABSENCE OF OTHER SPECIFIED PARTS OF DIGESTIVE TRACT: Chronic | ICD-10-CM

## 2024-07-29 DIAGNOSIS — Z86.79 PERSONAL HISTORY OF OTHER DISEASES OF THE CIRCULATORY SYSTEM: Chronic | ICD-10-CM

## 2024-07-29 DIAGNOSIS — Z98.890 OTHER SPECIFIED POSTPROCEDURAL STATES: Chronic | ICD-10-CM

## 2024-07-29 DIAGNOSIS — Z95.1 PRESENCE OF AORTOCORONARY BYPASS GRAFT: Chronic | ICD-10-CM

## 2024-07-29 DIAGNOSIS — R06.00 DYSPNEA, UNSPECIFIED: ICD-10-CM

## 2024-07-29 LAB
ANION GAP SERPL CALC-SCNC: 5 MMOL/L — SIGNIFICANT CHANGE UP (ref 5–17)
BUN SERPL-MCNC: 13 MG/DL — SIGNIFICANT CHANGE UP (ref 7–23)
CALCIUM SERPL-MCNC: 9.1 MG/DL — SIGNIFICANT CHANGE UP (ref 8.5–10.1)
CHLORIDE SERPL-SCNC: 106 MMOL/L — SIGNIFICANT CHANGE UP (ref 96–108)
CO2 SERPL-SCNC: 26 MMOL/L — SIGNIFICANT CHANGE UP (ref 22–31)
CREAT SERPL-MCNC: 0.9 MG/DL — SIGNIFICANT CHANGE UP (ref 0.5–1.3)
EGFR: 90 ML/MIN/1.73M2 — SIGNIFICANT CHANGE UP
GLUCOSE SERPL-MCNC: 160 MG/DL — HIGH (ref 70–99)
HCT VFR BLD CALC: 40.6 % — SIGNIFICANT CHANGE UP (ref 39–50)
HGB BLD-MCNC: 12.7 G/DL — LOW (ref 13–17)
MCHC RBC-ENTMCNC: 26.1 PG — LOW (ref 27–34)
MCHC RBC-ENTMCNC: 31.3 GM/DL — LOW (ref 32–36)
MCV RBC AUTO: 83.5 FL — SIGNIFICANT CHANGE UP (ref 80–100)
NRBC # BLD: 0 /100 WBCS — SIGNIFICANT CHANGE UP (ref 0–0)
PLATELET # BLD AUTO: 320 K/UL — SIGNIFICANT CHANGE UP (ref 150–400)
POTASSIUM SERPL-MCNC: 3.9 MMOL/L — SIGNIFICANT CHANGE UP (ref 3.5–5.3)
POTASSIUM SERPL-SCNC: 3.9 MMOL/L — SIGNIFICANT CHANGE UP (ref 3.5–5.3)
RBC # BLD: 4.86 M/UL — SIGNIFICANT CHANGE UP (ref 4.2–5.8)
RBC # FLD: 17.4 % — HIGH (ref 10.3–14.5)
SODIUM SERPL-SCNC: 137 MMOL/L — SIGNIFICANT CHANGE UP (ref 135–145)
WBC # BLD: 8.59 K/UL — SIGNIFICANT CHANGE UP (ref 3.8–10.5)
WBC # FLD AUTO: 8.59 K/UL — SIGNIFICANT CHANGE UP (ref 3.8–10.5)

## 2024-07-29 PROCEDURE — 93010 ELECTROCARDIOGRAM REPORT: CPT

## 2024-07-29 PROCEDURE — 36415 COLL VENOUS BLD VENIPUNCTURE: CPT

## 2024-07-29 PROCEDURE — 93459 L HRT ART/GRFT ANGIO: CPT | Mod: 26

## 2024-07-29 PROCEDURE — C1894: CPT

## 2024-07-29 PROCEDURE — C1887: CPT

## 2024-07-29 PROCEDURE — 82962 GLUCOSE BLOOD TEST: CPT

## 2024-07-29 PROCEDURE — 80048 BASIC METABOLIC PNL TOTAL CA: CPT

## 2024-07-29 PROCEDURE — C1769: CPT

## 2024-07-29 PROCEDURE — 93005 ELECTROCARDIOGRAM TRACING: CPT | Mod: XU

## 2024-07-29 PROCEDURE — 99152 MOD SED SAME PHYS/QHP 5/>YRS: CPT

## 2024-07-29 PROCEDURE — 93571 IV DOP VEL&/PRESS C FLO 1ST: CPT | Mod: 26,52,LM

## 2024-07-29 PROCEDURE — 93799 UNLISTED CV SVC/PROCEDURE: CPT

## 2024-07-29 PROCEDURE — 93459 L HRT ART/GRFT ANGIO: CPT

## 2024-07-29 PROCEDURE — C1760: CPT

## 2024-07-29 PROCEDURE — 85027 COMPLETE CBC AUTOMATED: CPT

## 2024-07-29 RX ORDER — TIRZEPATIDE 2.5 MG/.5ML
10 INJECTION, SOLUTION SUBCUTANEOUS
Refills: 0 | DISCHARGE

## 2024-07-29 RX ORDER — FLUTICASONE FUROATE, UMECLIDINIUM BROMIDE AND VILANTEROL TRIFENATATE 200; 62.5; 25 UG/1; UG/1; UG/1
1 POWDER RESPIRATORY (INHALATION)
Refills: 0 | DISCHARGE

## 2024-07-29 RX ORDER — BACTERIOSTATIC SODIUM CHLORIDE 0.9 %
500 VIAL (ML) INJECTION
Refills: 0 | Status: DISCONTINUED | OUTPATIENT
Start: 2024-07-29 | End: 2024-08-12

## 2024-07-29 RX ORDER — BACTERIOSTATIC SODIUM CHLORIDE 0.9 %
250 VIAL (ML) INJECTION ONCE
Refills: 0 | Status: COMPLETED | OUTPATIENT
Start: 2024-07-29 | End: 2024-07-29

## 2024-07-29 RX ORDER — CYANOCOBALAMIN 1000 UG/ML
1 INJECTION, SOLUTION INTRAMUSCULAR; SUBCUTANEOUS
Refills: 0 | DISCHARGE

## 2024-07-29 RX ORDER — ROPINIROLE 1 MG/1
2 TABLET ORAL
Refills: 0 | DISCHARGE

## 2024-07-29 RX ADMIN — Medication 75 MILLILITER(S): at 08:23

## 2024-07-29 RX ADMIN — Medication 500 MILLILITER(S): at 07:56

## 2024-07-29 NOTE — ASU PATIENT PROFILE, ADULT - NSTOBACCO TYPE_GEN_A_CORE_RD
Patients daughter called to inform that  Patient past away in 11/26/2018, will like a call back from a Nurse  
Cigarettes

## 2024-07-29 NOTE — ASU DISCHARGE PLAN (ADULT/PEDIATRIC) - CARE PROVIDER_API CALL
Leon South  Interventional Cardiology  [address] [place] [address]  Phone: [phone]  Fax: [phone]  Established Patient  Follow Up Time: 2 weeks

## 2024-07-29 NOTE — ASU DISCHARGE PLAN (ADULT/PEDIATRIC) - ASU DC SPECIAL INSTRUCTIONSFT
Wound Care:   the day AFTER your procedure...     Remove the bandage from the site and gently clean with soap and water then pat dry; leave open to air.     You may take a brief shower     Do NOT apply lotions, creams, powders, ointments, or perfumes to your incision site unless prescribed by your physician     Do NOT soak your procedure site for 1 week (no baths, no pools, no tubs, etc...)     Check  your groin and /or wrist daily. A small amount of bruising, and soreness are normal    ACTIVITY: for 24 hours      - DO NOT DRIVE     - DO NOT make any important decisions or sign legal documents      - DO NOT operate heavy machinery      - you may resume sexual activity in 48 hours, unless otherwise instructed by your cardiologist          If your procedure was done through the WRIST: for the NEXT 3 DAYS:          - avoid pushing, pulling, with that affected wrist (such as pushing up from a seated position)          - avoid repeated movement of that hand and wrist (such as typing or hammering)          - DO NOT LIFT anything more than 5 pounds         If your procedure was done through the GROIN: for the NEXT 5 DAYS          - Limit climbing stairs, DO NOT soak in bathtub or pool          - no strenuous activities, pushing, pulling, straining          - Do not lift anything more than 10 pounds     MEDICATION:      Please take your medications as explained to you (found on your discharge paperwork)      DO NOT STOP taking them without consulting with your cardiologist first.      **if you have diabetes and take metformin please do not take this medication for 2 days after the procedure. Restart and take as usual starting day 3.    Follow the heart healthy diet recommended by your doctor.   Drink plenty of water for the next 24 hours unless otherwise instructed.  Do not drink any alcoholic beverages for 24 hours (beer, wine, liquor, etc).    If you smoke: STOP SMOKING. Call the [facility] at [phone] for assistance.    CALL your cardiologist/primary care doctor to make a follow-up appointment in 2 WEEKS     **CALL YOUR DOCTOR if you experience     fever, chills, body aches, or severe pain, swelling, redness, heat or yellow discharge at incision site     bleeding or excruciating pain at the procedural site, swelling (golf ball size) at your procedural site     CHEST PAIN     numbness, tingling, temperature change (of your procedural site)     Pain  -you may have pain after your surgery or procedure at the puncture site or in the artery/vein that has been treated.  -take pain medication as directed by your doctor.  call your doctor if your pain is not getting better within 5 days or if it gets worse  -prescription pain medication should be taken with food, and can cause constipation, an over-the-counter softener may be helpful    Nausea  -anesthesia/sedation can upset your stomach  -eat bland foods (Jell-o, crackers, toast) and drink ginger ale if you are nauseated  -drink plenty of fluids such as water or ginger ale (unless instructed otherwise by your doctor)  -if you have nausea or vomiting the day after your procedure, call your doctor    Bleeding  -you may have a small amount of oozing from your surgical or procedural site  -bleeding as the site can be dangerous and should prompt immediate medical attention    Infection  -if you have any of the following signs of infection, call your doctor:       redness, swelling, fever over 101 degrees, thick yellow/white drainage    If you are unable to reach your doctor, you may contact:   Dr Yin Arauz @ [phone]    **Call 911 immediately if:     - your hand or leg becomes blue, feels cold to touch, or if you have numbness or tingling     - bleeding or swelling from your wrist or groin site cannot be controlled or if area becomes very red or hot to touch     - you have pain, pressure, tightness or burning in your chest, arms, jaw or stomach; shortness of breath; nausea or excessive sweating; lightheadedness; dizziness or a fainting spell; or if you have sudden back or stomach pain     -you have rapid heartbeat or palpitations     - you have bright red blood in large amounts, severe pain at access site (wrist or groin) or significant new swelling at the puncture site    If/because you had anesthesia, for the next 24 hours you should NOT:  -drive a car, operate power tool or machinery  -drink alcohol, beer, or wine  -make important personal or business decisions  If you had any type of sedation, you may experience lightheadedness, dizziness, or sleepiness following your procedure. A responsible adult should stay with you for at least 24 hours following your procedure.

## 2024-07-29 NOTE — ASU PATIENT PROFILE, ADULT - FALL HARM RISK - UNIVERSAL INTERVENTIONS
Bed in lowest position, wheels locked, appropriate side rails in place/Call bell, personal items and telephone in reach/Instruct patient to call for assistance before getting out of bed or chair/Non-slip footwear when patient is out of bed/[place] to call system/Physically safe environment - no spills, clutter or unnecessary equipment/Purposeful Proactive Rounding/Room/bathroom lighting operational, light cord in reach

## 2024-07-29 NOTE — CHART NOTE - NSCHARTNOTEFT_GEN_A_CORE
Post Diagnostic Cardiac Catheterization Chart Note      Prelim cath report:   LHC via RRA  LM 70; Non-obstructive by iFR = 0.94.  Diag 50%  LCx 100%  %  LIMA-->LAD patent  SVG-->D2 patent  SVG-->OM1-->OM2 patent  full/official report to follow      Patient without complaints. Denies CP, SOB, palpitations, N/V, fever/chills, abd pain, numbness/tingling/weakness, other c/o at this time.    A+O x 3, neurologically intact  RRA access site stable (clean, dry, intact, without bleeding, heat, erythema, or hematoma). Radial band in place.  RUE motor, neuro, circ intact.  Hemodynamically stable, neurologically intact, VS stable, afebrile    A/P: s/p diagnostic LHC  non-obstructive CAD: medical mgmt  post cath access site precautions reviewed with pt who verbalized good understanding  d/c home once post cath recovery completed / discharge criteria met and wrist / hemodynamics remain stable (with radial band removed).   see discharge for instructions/plan Post Diagnostic Cardiac Catheterization Chart Note      Prelim cath report:   LHC via RFA.  Hemostasis with Angio-seal.   LM 70; Non-obstructive by iFR = 0.94.  Diag 50%  LCx 100%  %  LIMA-->LAD patent  SVG-->D2 patent  SVG-->OM1-->OM2 patent  full/official report to follow      Patient without complaints. Denies CP, SOB, palpitations, N/V, fever/chills, abd pain, numbness/tingling/weakness, other c/o at this time.    A+O x 3, neurologically intact  RFA access site stable (clean, dry, intact, without bleeding, heat, erythema, or hematoma).  RFA motor, neuro, circ intact.  Hemodynamically stable, neurologically intact, VS stable, afebrile    A/P: s/p diagnostic LHC  non-obstructive CAD: medical mgmt  post cath access site precautions reviewed with pt who verbalized good understanding  Resume home medications.  Patient instructed to restart metformin (Synjardy) in 48 hours.    d/c home once post cath recovery completed / discharge criteria met and groin / hemodynamics remain stable.   see discharge for instructions/plan

## 2024-08-22 LAB — HBA1C MFR BLD HPLC: 6.1

## 2024-08-28 ENCOUNTER — APPOINTMENT (OUTPATIENT)
Dept: CARDIOLOGY | Facility: CLINIC | Age: 74
End: 2024-08-28
Payer: MEDICARE

## 2024-08-28 ENCOUNTER — NON-APPOINTMENT (OUTPATIENT)
Age: 74
End: 2024-08-28

## 2024-08-28 VITALS
WEIGHT: 239 LBS | BODY MASS INDEX: 39.82 KG/M2 | HEIGHT: 65 IN | SYSTOLIC BLOOD PRESSURE: 120 MMHG | DIASTOLIC BLOOD PRESSURE: 68 MMHG | HEART RATE: 77 BPM | OXYGEN SATURATION: 95 %

## 2024-08-28 DIAGNOSIS — R06.09 OTHER FORMS OF DYSPNEA: ICD-10-CM

## 2024-08-28 DIAGNOSIS — I25.10 ATHEROSCLEROTIC HEART DISEASE OF NATIVE CORONARY ARTERY W/OUT ANGINA PECTORIS: ICD-10-CM

## 2024-08-28 PROCEDURE — G2211 COMPLEX E/M VISIT ADD ON: CPT

## 2024-08-28 PROCEDURE — 99214 OFFICE O/P EST MOD 30 MIN: CPT

## 2024-08-28 PROCEDURE — 93000 ELECTROCARDIOGRAM COMPLETE: CPT

## 2024-08-28 RX ORDER — TIRZEPATIDE 10 MG/.5ML
10 INJECTION, SOLUTION SUBCUTANEOUS
Refills: 0 | Status: ACTIVE | COMMUNITY

## 2024-08-30 NOTE — DISCUSSION/SUMMARY
[EKG obtained to assist in diagnosis and management of assessed problem(s)] : EKG obtained to assist in diagnosis and management of assessed problem(s) [FreeTextEntry1] : Ilsa has history of hypertension, hyperlipidemia, and diabetes.  He underwent three-vessel bypass graft surgery in 2015.  Repeat cardiac catheterization in July this year showed all his bypass grafts were patent with distal  LAD disease.  Stress test in October 2022 revealed no evidence of ischemia.  Recent echocardiogram performed in October 2022,  demonstrated a normal ejection fraction without significant valvular heart disease.  Carotid Doppler last year showed mild nonobstructing plaque.  Exam and ECG are unremarkable.  Unfortunately, he has significant shortness of breath with activity. His O2 saturation today at rest [id number] At this point, it seems that this is related to his obesity and deconditioning, and not any structural heart disease, or any significant lung disease.   He remains sedentary, and obese.  It remains unclear, noting that his symptoms of coronary disease have been dyspnea.  I do note that catheterization in 2017, 2 years after bypass, to evaluate shortness of breath, revealed patent grafts and occluded native arteries.    He will plan to see Dr South in about 6 months.  Further recommendations will depend on his clinical course.

## 2024-08-30 NOTE — HISTORY OF PRESENT ILLNESS
[FreeTextEntry1] : I saw Ilsa Samuel in the office today for a follow up visit.  He was last seen in the office on 7/16/24.  He is now 74 years old, with a history of diabetes, hypertension and hyperlipidemia.  He has a history of coronary artery disease, diagnosed in November 2015, in the setting of unstable angina.  At that time, catheterization revealed a left dominant circulation, with an 80% stenosis of the distal left main, a 90% stenosis at the ostium of the circumflex, and 80% stenosis of the first obtuse marginal branch.  He went on to have quadruple vessel bypass surgery, with a LIMA graft to the LAD, and vein grafts to a diagonal, first obtuse marginal, and posterolateral artery.  In 2017 he reported exertional dyspnea.  Catheterization was again performed, which revealed occluded natives, with patent grafts to the LAD, first diagonal, first obtuse marginal and second obtuse marginal.  He was enrolled in cardiac rehabilitation, and did well with that management strategy.     He was seen in the office in October 2022, at which time he reported progressive shortness of breath, and a lack of improvement in his exercise capacity despite his joint replacement surgery.  I could not easily differentiate a cardiac issue from a functional one, and so I recommended echocardiography and pharmacologic nuclear stress testing.  These came out well.  I saw him in April, 2024 at which time he continued to experience a degree of dyspnea with activity.  I felt this was likely related to obesity and deconditioning.  I did not feel that additional testing was warranted, but left open the possibility that we could do some additional testing pending his clinical course.  He presents to the office today without any new complaints.  He remains short of breath, without any significant change.  He remains extremely sedentary, and blames it on his back, plantar fasciitis, and a number of different factors.  He has not had any significant chest pain.  He specifically denies symptoms suggestive of congestive heart failure or arrhythmia. His cholesterol was high and his pravastatin was increased. A cardiac catheterization performed on 7/29/24 revealed patent SVG-D2, [id number], LIMA-LAD, distal LAD disease. The recommendation was medical therapy. He recently saw his pulmonologist who changed his inhaler to relieve his shortness of breath. He will follow up with a chest X-Ray.

## 2024-08-30 NOTE — DISCUSSION/SUMMARY
[EKG obtained to assist in diagnosis and management of assessed problem(s)] : EKG obtained to assist in diagnosis and management of assessed problem(s) [FreeTextEntry1] : Ilsa has history of hypertension, hyperlipidemia, and diabetes.  He underwent three-vessel bypass graft surgery in 2015.  Repeat cardiac catheterization in July this year showed all his bypass grafts were patent with distal  LAD disease.  Stress test in October 2022 revealed no evidence of ischemia.  Recent echocardiogram performed in October 2022,  demonstrated a normal ejection fraction without significant valvular heart disease.  Carotid Doppler last year showed mild nonobstructing plaque.  Exam and ECG are unremarkable.  Unfortunately, he has significant shortness of breath with activity. His O2 saturation today at rest [id number]%. At this point, it seems that this is related to his obesity and deconditioning, and not any structural heart disease, or any significant lung disease.   He remains sedentary, and obese.  It remains unclear, noting that his symptoms of coronary disease have been dyspnea.  I do note that catheterization in 2017, 2 years after bypass, to evaluate shortness of breath, revealed patent grafts and occluded native arteries.    He will plan to see Dr South in about 6 months.  Further recommendations will depend on his clinical course.

## 2024-08-30 NOTE — CARDIOLOGY SUMMARY
[Normal] : normal [No Ischemia] : no Ischemia [___] : [unfilled] [LVEF ___%] : LVEF [unfilled]% [___] : [unfilled] [de-identified] : sinus  with nonspecific QRS widening

## 2024-08-30 NOTE — CARDIOLOGY SUMMARY
[Normal] : normal [No Ischemia] : no Ischemia [___] : [unfilled] [LVEF ___%] : LVEF [unfilled]% [___] : [unfilled] [de-identified] : sinus  with nonspecific QRS widening

## 2024-08-30 NOTE — REASON FOR VISIT
[Initial Evaluation] : an initial evaluation of [Dyspnea] : dyspnea [Hyperlipidemia] : hyperlipidemia [Hypertension] : hypertension [Coronary Artery Disease] : coronary artery disease [Other: ____] : [unfilled] [Spouse] : spouse [FreeTextEntry1] :  Diabetes

## 2024-08-30 NOTE — HISTORY OF PRESENT ILLNESS
[FreeTextEntry1] : I saw Ilsa Samuel in the office today for a follow up visit.  He was last seen in the office on 7/16/24.  He is now 74 years old, with a history of diabetes, hypertension and hyperlipidemia.  He has a history of coronary artery disease, diagnosed in November 2015, in the setting of unstable angina.  At that time, catheterization revealed a left dominant circulation, with an 80% stenosis of the distal left main, a 90% stenosis at the ostium of the circumflex, and 80% stenosis of the first obtuse marginal branch.  He went on to have quadruple vessel bypass surgery, with a LIMA graft to the LAD, and vein grafts to a diagonal, first obtuse marginal, and posterolateral artery.  In 2017 he reported exertional dyspnea.  Catheterization was again performed, which revealed occluded natives, with patent grafts to the LAD, first diagonal, first obtuse marginal and second obtuse marginal.  He was enrolled in cardiac rehabilitation, and did well with that management strategy.     He was seen in the office in October 2022, at which time he reported progressive shortness of breath, and a lack of improvement in his exercise capacity despite his joint replacement surgery.  I could not easily differentiate a cardiac issue from a functional one, and so I recommended echocardiography and pharmacologic nuclear stress testing.  These came out well.  I saw him in April, 2024 at which time he continued to experience a degree of dyspnea with activity.  I felt this was likely related to obesity and deconditioning.  I did not feel that additional testing was warranted, but left open the possibility that we could do some additional testing pending his clinical course.  He presents to the office today without any new complaints.  He remains short of breath, without any significant change.  He remains extremely sedentary, and blames it on his back, plantar fasciitis, and a number of different factors.  He has not had any significant chest pain.  He specifically denies symptoms suggestive of congestive heart failure or arrhythmia. His cholesterol was high and his pravastatin was increased. A cardiac catheterization performed on 7/29/24 revealed patent SVG-D2, [id number], LIMA-LAD, distal LAD disease. The recommendation was medical therapy. He recently saw his pulmonologist who changed his inhaler to relieve his shortness of breath. He will follow up with a chest X-Ray.

## 2024-08-30 NOTE — ADDENDUM
[FreeTextEntry1] : severe sob cath with patent grafts, for med therapy sat decreased today with position change seeing pulm in followup, and is now on mdi and on cpap

## 2024-08-30 NOTE — REVIEW OF SYSTEMS
[Hand Pain] : hand pain [Knee Pain] : knee pain [Negative] : Genitourinary [Joint Stiffness] : joint stiffness [Joint Pain] : no joint pain [Rash] : no rash [Dizziness] : no dizziness [Depression] : no depression [Anxiety] : no anxiety [Easy Bleeding] : no tendency for easy bleeding [Easy Bruising] : no tendency for easy bruising [FreeTextEntry9] : Bilateral knee replacements

## 2024-10-24 ENCOUNTER — NON-APPOINTMENT (OUTPATIENT)
Age: 74
End: 2024-10-24

## 2024-12-06 ENCOUNTER — RX RENEWAL (OUTPATIENT)
Age: 74
End: 2024-12-06

## 2025-02-18 NOTE — DISCHARGE NOTE PROVIDER - NSDCACTIVITY_GEN_ALL_CORE
Patient called requesting this medication to be refill:    latanoprost (XALATAN) 0.005 % ophthalmic solution    Do not drive or operate machinery/Do not make important decisions/Stairs allowed/Walking - Indoors allowed/No heavy lifting/straining/Walking - Outdoors allowed

## 2025-02-26 ENCOUNTER — APPOINTMENT (OUTPATIENT)
Dept: CARDIOLOGY | Facility: CLINIC | Age: 75
End: 2025-02-26
Payer: MEDICARE

## 2025-02-26 ENCOUNTER — NON-APPOINTMENT (OUTPATIENT)
Age: 75
End: 2025-02-26

## 2025-02-26 VITALS
HEART RATE: 74 BPM | OXYGEN SATURATION: 97 % | BODY MASS INDEX: 39.82 KG/M2 | WEIGHT: 239 LBS | HEIGHT: 65 IN | SYSTOLIC BLOOD PRESSURE: 121 MMHG | DIASTOLIC BLOOD PRESSURE: 71 MMHG

## 2025-02-26 DIAGNOSIS — I25.10 ATHEROSCLEROTIC HEART DISEASE OF NATIVE CORONARY ARTERY W/OUT ANGINA PECTORIS: ICD-10-CM

## 2025-02-26 PROCEDURE — 99214 OFFICE O/P EST MOD 30 MIN: CPT

## 2025-02-26 PROCEDURE — 93000 ELECTROCARDIOGRAM COMPLETE: CPT

## 2025-03-24 ENCOUNTER — RX RENEWAL (OUTPATIENT)
Age: 75
End: 2025-03-24

## 2025-05-19 NOTE — H&P CARDIOLOGY - [facility]
CAD (coronary artery disease)    Diabetes mellitus, type 2    Hyperlipidemia    Hypertension [Negative] : Heme/Lymph

## 2025-08-25 ENCOUNTER — RX RENEWAL (OUTPATIENT)
Age: 75
End: 2025-08-25

## 2025-08-28 ENCOUNTER — APPOINTMENT (OUTPATIENT)
Dept: CARDIOLOGY | Facility: CLINIC | Age: 75
End: 2025-08-28
Payer: MEDICARE

## 2025-08-28 VITALS
OXYGEN SATURATION: 96 % | HEIGHT: 65 IN | BODY MASS INDEX: 38.49 KG/M2 | DIASTOLIC BLOOD PRESSURE: 71 MMHG | HEART RATE: 88 BPM | WEIGHT: 231 LBS | SYSTOLIC BLOOD PRESSURE: 113 MMHG

## 2025-08-28 DIAGNOSIS — I25.10 ATHEROSCLEROTIC HEART DISEASE OF NATIVE CORONARY ARTERY W/OUT ANGINA PECTORIS: ICD-10-CM

## 2025-08-28 PROCEDURE — 99214 OFFICE O/P EST MOD 30 MIN: CPT

## 2025-08-28 PROCEDURE — 93000 ELECTROCARDIOGRAM COMPLETE: CPT

## 2025-09-09 ENCOUNTER — APPOINTMENT (OUTPATIENT)
Dept: ORTHOPEDIC SURGERY | Facility: CLINIC | Age: 75
End: 2025-09-09
Payer: MEDICARE

## 2025-09-09 DIAGNOSIS — M65.351 TRIGGER FINGER, RIGHT LITTLE FINGER: ICD-10-CM

## 2025-09-09 DIAGNOSIS — E11.9 TYPE 2 DIABETES MELLITUS W/OUT COMPLICATIONS: ICD-10-CM

## 2025-09-09 PROCEDURE — 99203 OFFICE O/P NEW LOW 30 MIN: CPT | Mod: 25

## 2025-09-09 PROCEDURE — 20550 NJX 1 TENDON SHEATH/LIGAMENT: CPT

## (undated) DEVICE — SOL INJ NS 0.9% 500ML 1-PORT

## (undated) DEVICE — SOL BETADINE POUCH 0.75OZ STERILE

## (undated) DEVICE — HOOD FLYTE STRYKER SURGICOOL W PEELAWAY

## (undated) DEVICE — SYR LUER LOK 20CC

## (undated) DEVICE — SOL IRR POUR NS 0.9% 500ML

## (undated) DEVICE — POSITIONER FOAM EGG CRATE ULNAR 2PCS (PINK)

## (undated) DEVICE — PACK MIS KNEE (1 PIECE)

## (undated) DEVICE — DRAPE SURGICAL #1010

## (undated) DEVICE — BAG DECANTER IV STERILE

## (undated) DEVICE — SAW BLADE STRYKER SAGITTAL DUAL CUT 64X35X.89MM

## (undated) DEVICE — SUT QUILL MONODERM 2-0 3/8 CIRCLE 45CM

## (undated) DEVICE — VENODYNE/SCD SLEEVE CALF LARGE

## (undated) DEVICE — SOL INJ NS 0.9% 1000ML

## (undated) DEVICE — GLV 8 PROTEXIS (WHITE)

## (undated) DEVICE — SUT MONOCRYL 2-0 27" SH UNDYED

## (undated) DEVICE — ELCTR BOVIE PENCIL SMOKE EVACUATION

## (undated) DEVICE — MAKO BLADE NARROW

## (undated) DEVICE — GLV 8.5 PROTEXIS (BLUE)

## (undated) DEVICE — GLV 8.5 PROTEXIS (WHITE)

## (undated) DEVICE — MAKO CHECKPOINT KIT FEMORAL / TIBIAL

## (undated) DEVICE — STRYKER INTERPULSE HANDPIECE W IRR SUCTION TUBE

## (undated) DEVICE — SUT VICRYL 1 27" CPX UNDYED

## (undated) DEVICE — DRAPE IOBAN 23" X 23"

## (undated) DEVICE — DRSG DERMABOND 0.7ML

## (undated) DEVICE — TOURNIQUET CUFF 34" DUAL PORT W PLC

## (undated) DEVICE — HOOD FLYTE STRYKER HELMET SHIELD

## (undated) DEVICE — BLADE SCALPEL SAFETYLOCK #20

## (undated) DEVICE — SUT PDO 2 1/2 CIRCLE 40MM NDL 45CM

## (undated) DEVICE — TUBING TUR 2 PRONG

## (undated) DEVICE — MAKO BLADE STANDARD

## (undated) DEVICE — DRSG AQUACEL 3.5 X 12"

## (undated) DEVICE — SOL IRR POUR H2O 250ML

## (undated) DEVICE — SUT QUILL PDO 1 45CM CTX 48MM

## (undated) DEVICE — MAKO DRAPE KIT

## (undated) DEVICE — NDL HYPO SAFE 22G X 1.5" (BLACK)

## (undated) DEVICE — SUT QUILL MONODERM 0 1/2 CIRCLE TAPR 45CM 26MM

## (undated) DEVICE — GLV 7.5 PROTEXIS (WHITE)

## (undated) DEVICE — MAKO VIZADISC KNEE TRACKING KIT

## (undated) DEVICE — DRAPE MAGNETIC INSTRUMENT MEDIUM

## (undated) DEVICE — POSITIONER CARDIAC BUMP

## (undated) DEVICE — SUT STRATAFIX SYMMETRIC PDS PLUS 1 18" CTX VIOLET

## (undated) DEVICE — SPECIMEN CONTAINER 100ML

## (undated) DEVICE — DRSG STOCKINETTE IMPERVIOUS XL

## (undated) DEVICE — SAW BLADE STRYKER SAGITTAL EXTRA WIDE THIN SHORT

## (undated) DEVICE — SLING SHOULDER IMMOBILIZER CLINIC LARGE

## (undated) DEVICE — FRAZIER SUCTION TIP 18FR

## (undated) DEVICE — WARMING BLANKET UPPER ADULT